# Patient Record
Sex: FEMALE | Race: WHITE | Employment: UNEMPLOYED | URBAN - METROPOLITAN AREA
[De-identification: names, ages, dates, MRNs, and addresses within clinical notes are randomized per-mention and may not be internally consistent; named-entity substitution may affect disease eponyms.]

---

## 2017-05-12 PROBLEM — M51.36 DDD (DEGENERATIVE DISC DISEASE), LUMBAR: Status: ACTIVE | Noted: 2017-05-12

## 2017-05-12 PROBLEM — M54.30 SCIATIC LEG PAIN: Status: ACTIVE | Noted: 2017-05-12

## 2017-06-12 PROBLEM — M51.26 LUMBAR DISC HERNIATION: Status: ACTIVE | Noted: 2017-06-12

## 2017-06-12 PROBLEM — M48.061 LUMBAR STENOSIS: Status: ACTIVE | Noted: 2017-06-12

## 2019-08-20 ENCOUNTER — OFFICE VISIT (OUTPATIENT)
Dept: FAMILY MEDICINE CLINIC | Age: 59
End: 2019-08-20
Payer: COMMERCIAL

## 2019-08-20 VITALS
HEIGHT: 62 IN | TEMPERATURE: 98.5 F | HEART RATE: 88 BPM | BODY MASS INDEX: 35.33 KG/M2 | SYSTOLIC BLOOD PRESSURE: 122 MMHG | WEIGHT: 192 LBS | OXYGEN SATURATION: 96 % | RESPIRATION RATE: 18 BRPM | DIASTOLIC BLOOD PRESSURE: 78 MMHG

## 2019-08-20 DIAGNOSIS — R73.01 IFG (IMPAIRED FASTING GLUCOSE): ICD-10-CM

## 2019-08-20 DIAGNOSIS — M48.061 SPINAL STENOSIS OF LUMBAR REGION, UNSPECIFIED WHETHER NEUROGENIC CLAUDICATION PRESENT: ICD-10-CM

## 2019-08-20 DIAGNOSIS — Z12.31 SCREENING MAMMOGRAM, ENCOUNTER FOR: ICD-10-CM

## 2019-08-20 DIAGNOSIS — I10 ESSENTIAL HYPERTENSION: Primary | ICD-10-CM

## 2019-08-20 DIAGNOSIS — Z90.49 HISTORY OF CHOLECYSTECTOMY: ICD-10-CM

## 2019-08-20 DIAGNOSIS — Z98.84 HISTORY OF ROUX-EN-Y GASTRIC BYPASS: ICD-10-CM

## 2019-08-20 DIAGNOSIS — E03.9 ACQUIRED HYPOTHYROIDISM: ICD-10-CM

## 2019-08-20 DIAGNOSIS — Z83.3 FAMILY HISTORY OF DIABETES MELLITUS (DM): ICD-10-CM

## 2019-08-20 DIAGNOSIS — Z12.11 SCREEN FOR COLON CANCER: ICD-10-CM

## 2019-08-20 DIAGNOSIS — R53.83 FATIGUE, UNSPECIFIED TYPE: ICD-10-CM

## 2019-08-20 DIAGNOSIS — E78.5 DYSLIPIDEMIA: ICD-10-CM

## 2019-08-20 LAB — HBA1C MFR BLD: 5.3 %

## 2019-08-20 PROCEDURE — 99203 OFFICE O/P NEW LOW 30 MIN: CPT | Performed by: FAMILY MEDICINE

## 2019-08-20 PROCEDURE — 83036 HEMOGLOBIN GLYCOSYLATED A1C: CPT | Performed by: FAMILY MEDICINE

## 2019-08-20 RX ORDER — CALCIUM CARBONATE 500(1250)
1000 TABLET ORAL DAILY
COMMUNITY

## 2019-08-20 RX ORDER — FERROUS SULFATE 325(65) MG
325 TABLET ORAL
COMMUNITY

## 2019-08-20 RX ORDER — LEVOTHYROXINE SODIUM 0.05 MG/1
50 TABLET ORAL DAILY
Qty: 90 TABLET | Refills: 1 | Status: SHIPPED
Start: 2019-08-20 | End: 2020-02-05

## 2019-08-20 ASSESSMENT — ENCOUNTER SYMPTOMS
ABDOMINAL DISTENTION: 0
ALLERGIC/IMMUNOLOGIC NEGATIVE: 1
SINUS PAIN: 0
BOWEL INCONTINENCE: 0
EYE REDNESS: 0
RECTAL PAIN: 0
CHEST TIGHTNESS: 0
STRIDOR: 0
EYE PAIN: 0
SINUS PRESSURE: 0
SORE THROAT: 0
EYE DISCHARGE: 0
SHORTNESS OF BREATH: 0
RHINORRHEA: 0
CONSTIPATION: 0
COLOR CHANGE: 0
EYE ITCHING: 0
CHOKING: 0
BLURRED VISION: 0
BACK PAIN: 1
ANAL BLEEDING: 0
NAUSEA: 0
TROUBLE SWALLOWING: 0
DIARRHEA: 0
APNEA: 0
FACIAL SWELLING: 0
VOMITING: 0
BLOOD IN STOOL: 0
ORTHOPNEA: 0
COUGH: 0
ABDOMINAL PAIN: 0
RESPIRATORY NEGATIVE: 1
VOICE CHANGE: 0
PHOTOPHOBIA: 0
WHEEZING: 0
GASTROINTESTINAL NEGATIVE: 1

## 2019-08-20 ASSESSMENT — PATIENT HEALTH QUESTIONNAIRE - PHQ9
2. FEELING DOWN, DEPRESSED OR HOPELESS: 1
SUM OF ALL RESPONSES TO PHQ9 QUESTIONS 1 & 2: 2
SUM OF ALL RESPONSES TO PHQ QUESTIONS 1-9: 2
1. LITTLE INTEREST OR PLEASURE IN DOING THINGS: 1
SUM OF ALL RESPONSES TO PHQ QUESTIONS 1-9: 2

## 2019-08-20 NOTE — PROGRESS NOTES
is present all day. Pertinent negatives include no abdominal pain, bladder incontinence, bowel incontinence, chest pain, dysuria, fever, headaches, leg pain, numbness, paresis, paresthesias, pelvic pain, perianal numbness, tingling, weakness or weight loss. ROS:  Review of Systems   Constitutional: Negative for activity change, appetite change, chills, diaphoresis, fever, malaise/fatigue, unexpected weight change and weight loss. HENT: Negative. Negative for congestion, dental problem, drooling, ear discharge, ear pain, facial swelling, hearing loss, mouth sores, nosebleeds, postnasal drip, rhinorrhea, sinus pressure, sinus pain, sneezing, sore throat, tinnitus, trouble swallowing and voice change. Eyes: Negative for blurred vision, photophobia, pain, discharge, redness, itching and visual disturbance. Respiratory: Negative. Negative for apnea, cough, choking, chest tightness, shortness of breath, wheezing and stridor. Cardiovascular: Negative. Negative for chest pain, palpitations, orthopnea, leg swelling and PND. Gastrointestinal: Negative. Negative for abdominal distention, abdominal pain, anal bleeding, blood in stool, bowel incontinence, constipation, diarrhea, nausea, rectal pain and vomiting. Endocrine: Negative. Negative for cold intolerance and heat intolerance. Genitourinary: Negative. Negative for bladder incontinence, decreased urine volume, difficulty urinating, dyspareunia, dysuria, enuresis, flank pain, frequency, genital sores, hematuria, menstrual problem, pelvic pain, urgency, vaginal bleeding, vaginal discharge and vaginal pain. Musculoskeletal: Positive for back pain. Negative for arthralgias, gait problem, joint swelling, myalgias, neck pain and neck stiffness. Skin: Negative. Negative for color change, rash and wound. Allergic/Immunologic: Negative. Negative for environmental allergies, food allergies and immunocompromised state. Neurological: Negative. Negative for tingling, syncope, facial asymmetry, weakness, light-headedness, numbness, headaches and paresthesias. Hematological: Negative. Negative for adenopathy. Does not bruise/bleed easily. Psychiatric/Behavioral: Negative. Negative for agitation, behavioral problems, decreased concentration, dysphoric mood, hallucinations, self-injury, sleep disturbance and suicidal ideas. The patient is not hyperactive. Past Medical/Surgical Hx;  Reviewed with patient      Diagnosis Date    Anxiety     Chronic back pain     Hypothyroidism     Meniere's disease     Unspecified sleep apnea      Past Surgical History:   Procedure Laterality Date     SECTION      CHOLECYSTECTOMY  2012 - Dr. Aldo Viramontes, [facility], Laparoscopic     RIGHT COLECTOMY  2014    Justa Saunders - laparoscopic right colectomy     TAMMIE-EN-Y GASTRIC BYPASS  2010 - Laparoscopic    TUBAL LIGATION      UPPER GASTROINTESTINAL ENDOSCOPY         Past Family Hx:  Reviewed with patient      Problem Relation Age of Onset    High Blood Pressure Mother     Heart Disease Mother     Obesity Mother     High Blood Pressure Father     High Blood Pressure Brother     Cancer Brother     Obesity Brother        Social Hx:  Reviewed with patient  Social History     Tobacco Use    Smoking status: Current Every Day Smoker     Packs/day: 1.00     Years: 7.00     Pack years: 7.00     Types: Cigarettes     Start date: 2012    Smokeless tobacco: Never Used    Tobacco comment: Previously smoked for 8 years, then quit for 15 years, then started again   Substance Use Topics    Alcohol use: No       OBJECTIVE  /78   Pulse 88   Temp 98.5 °F (36.9 °C) (Temporal)   Resp 18   Ht 5' 2\" (1.575 m)   Wt 192 lb (87.1 kg)   LMP  (LMP Unknown)   SpO2 96%   Breastfeeding? No   BMI 35.12 kg/m²     Problem List:  Maria Moeller does not have any pertinent problems on file.     PHYS EX:  Physical Exam   Constitutional: She is oriented to person,

## 2019-09-26 ENCOUNTER — HOSPITAL ENCOUNTER (OUTPATIENT)
Dept: MAMMOGRAPHY | Age: 59
Discharge: HOME | End: 2019-09-28
Payer: COMMERCIAL

## 2019-09-26 DIAGNOSIS — Z12.31 SCREENING MAMMOGRAM, ENCOUNTER FOR: ICD-10-CM

## 2019-10-25 ENCOUNTER — PREP FOR PROCEDURE (OUTPATIENT)
Dept: PAIN MANAGEMENT | Age: 59
End: 2019-10-25

## 2019-10-25 ENCOUNTER — OFFICE VISIT (OUTPATIENT)
Dept: PAIN MANAGEMENT | Age: 59
End: 2019-10-25
Payer: COMMERCIAL

## 2019-10-25 VITALS
DIASTOLIC BLOOD PRESSURE: 80 MMHG | BODY MASS INDEX: 32.78 KG/M2 | HEART RATE: 110 BPM | RESPIRATION RATE: 16 BRPM | SYSTOLIC BLOOD PRESSURE: 122 MMHG | OXYGEN SATURATION: 97 % | TEMPERATURE: 98.6 F | WEIGHT: 185 LBS | HEIGHT: 63 IN

## 2019-10-25 DIAGNOSIS — M47.812 CERVICAL FACET JOINT SYNDROME: ICD-10-CM

## 2019-10-25 DIAGNOSIS — G89.4 CHRONIC PAIN SYNDROME: ICD-10-CM

## 2019-10-25 DIAGNOSIS — M47.816 LUMBAR SPONDYLOSIS: ICD-10-CM

## 2019-10-25 DIAGNOSIS — M54.16 LUMBAR RADICULOPATHY: Primary | ICD-10-CM

## 2019-10-25 DIAGNOSIS — M51.9 LUMBAR DISC DISORDER: ICD-10-CM

## 2019-10-25 DIAGNOSIS — M47.812 CERVICAL SPONDYLOSIS: ICD-10-CM

## 2019-10-25 DIAGNOSIS — M47.816 LUMBAR FACET ARTHROPATHY: ICD-10-CM

## 2019-10-25 PROCEDURE — 99204 OFFICE O/P NEW MOD 45 MIN: CPT | Performed by: PAIN MEDICINE

## 2019-11-12 ENCOUNTER — TELEPHONE (OUTPATIENT)
Dept: PAIN MANAGEMENT | Age: 59
End: 2019-11-12

## 2020-02-07 ENCOUNTER — OFFICE VISIT (OUTPATIENT)
Dept: FAMILY MEDICINE CLINIC | Age: 60
End: 2020-02-07
Payer: COMMERCIAL

## 2020-02-07 ENCOUNTER — HOSPITAL ENCOUNTER (OUTPATIENT)
Age: 60
Discharge: HOME | End: 2020-02-09
Payer: COMMERCIAL

## 2020-02-07 VITALS
HEART RATE: 92 BPM | WEIGHT: 198.8 LBS | HEIGHT: 63 IN | DIASTOLIC BLOOD PRESSURE: 74 MMHG | OXYGEN SATURATION: 97 % | RESPIRATION RATE: 18 BRPM | TEMPERATURE: 98.2 F | SYSTOLIC BLOOD PRESSURE: 130 MMHG | BODY MASS INDEX: 35.22 KG/M2

## 2020-02-07 LAB
ALBUMIN SERPL-MCNC: 4.2 G/DL (ref 3.5–5.2)
ALP BLD-CCNC: 117 U/L (ref 35–104)
ALT SERPL-CCNC: 11 U/L (ref 0–32)
ANION GAP SERPL CALCULATED.3IONS-SCNC: 16 MMOL/L (ref 7–16)
AST SERPL-CCNC: 17 U/L (ref 0–31)
BASOPHILS ABSOLUTE: 0.09 E9/L (ref 0–0.2)
BASOPHILS RELATIVE PERCENT: 0.8 % (ref 0–2)
BILIRUB SERPL-MCNC: <0.2 MG/DL (ref 0–1.2)
BUN BLDV-MCNC: 8 MG/DL (ref 6–20)
CALCIUM SERPL-MCNC: 9.5 MG/DL (ref 8.6–10.2)
CHLORIDE BLD-SCNC: 106 MMOL/L (ref 98–107)
CHOLESTEROL, TOTAL: 167 MG/DL (ref 0–199)
CO2: 22 MMOL/L (ref 22–29)
CREAT SERPL-MCNC: 0.7 MG/DL (ref 0.5–1)
EOSINOPHILS ABSOLUTE: 0.15 E9/L (ref 0.05–0.5)
EOSINOPHILS RELATIVE PERCENT: 1.3 % (ref 0–6)
GFR AFRICAN AMERICAN: >60
GFR NON-AFRICAN AMERICAN: >60 ML/MIN/1.73
GLUCOSE BLD-MCNC: 90 MG/DL (ref 74–99)
HBA1C MFR BLD: 5.2 % (ref 4–5.6)
HCT VFR BLD CALC: 44.4 % (ref 34–48)
HDLC SERPL-MCNC: 40 MG/DL
HEMOGLOBIN: 13.9 G/DL (ref 11.5–15.5)
IMMATURE GRANULOCYTES #: 0.05 E9/L
IMMATURE GRANULOCYTES %: 0.4 % (ref 0–5)
LDL CHOLESTEROL CALCULATED: 82 MG/DL (ref 0–99)
LYMPHOCYTES ABSOLUTE: 3.24 E9/L (ref 1.5–4)
LYMPHOCYTES RELATIVE PERCENT: 28.4 % (ref 20–42)
MCH RBC QN AUTO: 31 PG (ref 26–35)
MCHC RBC AUTO-ENTMCNC: 31.3 % (ref 32–34.5)
MCV RBC AUTO: 99.1 FL (ref 80–99.9)
MONOCYTES ABSOLUTE: 0.96 E9/L (ref 0.1–0.95)
MONOCYTES RELATIVE PERCENT: 8.4 % (ref 2–12)
NEUTROPHILS ABSOLUTE: 6.91 E9/L (ref 1.8–7.3)
NEUTROPHILS RELATIVE PERCENT: 60.7 % (ref 43–80)
PDW BLD-RTO: 13.3 FL (ref 11.5–15)
PLATELET # BLD: 215 E9/L (ref 130–450)
PMV BLD AUTO: 13 FL (ref 7–12)
POTASSIUM SERPL-SCNC: 4.6 MMOL/L (ref 3.5–5)
RBC # BLD: 4.48 E12/L (ref 3.5–5.5)
SODIUM BLD-SCNC: 144 MMOL/L (ref 132–146)
TOTAL PROTEIN: 7.3 G/DL (ref 6.4–8.3)
TRIGL SERPL-MCNC: 227 MG/DL (ref 0–149)
TSH SERPL DL<=0.05 MIU/L-ACNC: 1.68 UIU/ML (ref 0.27–4.2)
VLDLC SERPL CALC-MCNC: 45 MG/DL
WBC # BLD: 11.4 E9/L (ref 4.5–11.5)

## 2020-02-07 PROCEDURE — 84443 ASSAY THYROID STIM HORMONE: CPT

## 2020-02-07 PROCEDURE — 96160 PT-FOCUSED HLTH RISK ASSMT: CPT | Performed by: FAMILY MEDICINE

## 2020-02-07 PROCEDURE — 99214 OFFICE O/P EST MOD 30 MIN: CPT | Performed by: FAMILY MEDICINE

## 2020-02-07 PROCEDURE — 83036 HEMOGLOBIN GLYCOSYLATED A1C: CPT

## 2020-02-07 PROCEDURE — G8484 FLU IMMUNIZE NO ADMIN: HCPCS | Performed by: FAMILY MEDICINE

## 2020-02-07 PROCEDURE — 85025 COMPLETE CBC W/AUTO DIFF WBC: CPT

## 2020-02-07 PROCEDURE — 3017F COLORECTAL CA SCREEN DOC REV: CPT | Performed by: FAMILY MEDICINE

## 2020-02-07 PROCEDURE — 80053 COMPREHEN METABOLIC PANEL: CPT

## 2020-02-07 PROCEDURE — 80061 LIPID PANEL: CPT

## 2020-02-07 PROCEDURE — 4004F PT TOBACCO SCREEN RCVD TLK: CPT | Performed by: FAMILY MEDICINE

## 2020-02-07 PROCEDURE — G8417 CALC BMI ABV UP PARAM F/U: HCPCS | Performed by: FAMILY MEDICINE

## 2020-02-07 PROCEDURE — G8427 DOCREV CUR MEDS BY ELIG CLIN: HCPCS | Performed by: FAMILY MEDICINE

## 2020-02-07 RX ORDER — LEVOTHYROXINE SODIUM 0.05 MG/1
50 TABLET ORAL DAILY
Qty: 90 TABLET | Refills: 1 | Status: SHIPPED
Start: 2020-02-07 | End: 2020-05-29 | Stop reason: SDUPTHER

## 2020-02-07 RX ORDER — PAROXETINE 10 MG/1
10 TABLET, FILM COATED ORAL DAILY
Qty: 30 TABLET | Refills: 3 | Status: SHIPPED
Start: 2020-02-07 | End: 2020-05-26

## 2020-02-07 RX ORDER — CEFDINIR 300 MG/1
300 CAPSULE ORAL 2 TIMES DAILY
Qty: 14 CAPSULE | Refills: 0 | Status: SHIPPED | OUTPATIENT
Start: 2020-02-07 | End: 2020-02-14

## 2020-02-07 RX ORDER — DEXAMETHASONE SODIUM PHOSPHATE 4 MG/ML
4 INJECTION, SOLUTION INTRA-ARTICULAR; INTRALESIONAL; INTRAMUSCULAR; INTRAVENOUS; SOFT TISSUE ONCE
Status: COMPLETED | OUTPATIENT
Start: 2020-02-07 | End: 2020-02-07

## 2020-02-07 RX ADMIN — DEXAMETHASONE SODIUM PHOSPHATE 4 MG: 4 INJECTION, SOLUTION INTRA-ARTICULAR; INTRALESIONAL; INTRAMUSCULAR; INTRAVENOUS; SOFT TISSUE at 09:02

## 2020-02-07 ASSESSMENT — PATIENT HEALTH QUESTIONNAIRE - PHQ9
5. POOR APPETITE OR OVEREATING: 3
2. FEELING DOWN, DEPRESSED OR HOPELESS: 3
SUM OF ALL RESPONSES TO PHQ QUESTIONS 1-9: 17
10. IF YOU CHECKED OFF ANY PROBLEMS, HOW DIFFICULT HAVE THESE PROBLEMS MADE IT FOR YOU TO DO YOUR WORK, TAKE CARE OF THINGS AT HOME, OR GET ALONG WITH OTHER PEOPLE: 1
1. LITTLE INTEREST OR PLEASURE IN DOING THINGS: 2
6. FEELING BAD ABOUT YOURSELF - OR THAT YOU ARE A FAILURE OR HAVE LET YOURSELF OR YOUR FAMILY DOWN: 3
8. MOVING OR SPEAKING SO SLOWLY THAT OTHER PEOPLE COULD HAVE NOTICED. OR THE OPPOSITE, BEING SO FIGETY OR RESTLESS THAT YOU HAVE BEEN MOVING AROUND A LOT MORE THAN USUAL: 0
3. TROUBLE FALLING OR STAYING ASLEEP: 3
4. FEELING TIRED OR HAVING LITTLE ENERGY: 3
SUM OF ALL RESPONSES TO PHQ QUESTIONS 1-9: 17
9. THOUGHTS THAT YOU WOULD BE BETTER OFF DEAD, OR OF HURTING YOURSELF: 0
SUM OF ALL RESPONSES TO PHQ9 QUESTIONS 1 & 2: 5
7. TROUBLE CONCENTRATING ON THINGS, SUCH AS READING THE NEWSPAPER OR WATCHING TELEVISION: 0

## 2020-02-11 ASSESSMENT — ENCOUNTER SYMPTOMS
ABDOMINAL DISTENTION: 0
BLOOD IN STOOL: 0
PHOTOPHOBIA: 0
RECTAL PAIN: 0
FACIAL SWELLING: 0
EYE DISCHARGE: 0
CHOKING: 0
SINUS PRESSURE: 1
EYES NEGATIVE: 1
RHINORRHEA: 1
EYE ITCHING: 0
STRIDOR: 0
DIARRHEA: 0
VOICE CHANGE: 0
SINUS PAIN: 1
EYE PAIN: 0
ANAL BLEEDING: 0
COLOR CHANGE: 0
WHEEZING: 0
SHORTNESS OF BREATH: 0
ABDOMINAL PAIN: 0
EYE REDNESS: 0
TROUBLE SWALLOWING: 0
BACK PAIN: 1
NAUSEA: 0
ALLERGIC/IMMUNOLOGIC NEGATIVE: 1
VOMITING: 0
SORE THROAT: 0
COUGH: 1
CHEST TIGHTNESS: 0
CONSTIPATION: 0

## 2020-02-11 NOTE — PROGRESS NOTES
Future  Not controlled. Lab, low chol. Diet. IFG (impaired fasting glucose)  -     CBC Auto Differential; Future  -     Comprehensive Metabolic Panel; Future  -     Hemoglobin A1C; Future  Patient instructed on labs. Fatigue, unspecified type  -     CBC Auto Differential; Future  -     Comprehensive Metabolic Panel; Future  -     TSH without Reflex; Future  Not controlled. Lab. Screening for breast cancer  -     [facility] DIGITAL SCREEN BILATERAL PER PROTOCOL; Future  -     CBC Auto Differential; Future  -     Comprehensive Metabolic Panel; Future  Pt instructed on matthieu. Radha.   Anxiety  -     PARoxetine (PAXIL) 10 MG tablet; Take 1 tablet by mouth daily  Stable. Depression, unspecified depression type  -     PARoxetine (PAXIL) 10 MG tablet; Take 1 tablet by mouth daily  Stable. Pt instructed if any worse go ED ASAP. Outpatient Encounter Medications as of 2020   Medication Sig Dispense Refill    levothyroxine (SYNTHROID) 50 MCG tablet Take 1 tablet by mouth daily 90 tablet 1    cefdinir (OMNICEF) 300 MG capsule Take 1 capsule by mouth 2 times daily for 7 days 14 capsule 0    PARoxetine (PAXIL) 10 MG tablet Take 1 tablet by mouth daily 30 tablet 3    calcium carbonate (OSCAL) 500 MG TABS tablet Take 1,000 mg by mouth daily      ferrous sulfate 325 (65 Fe) MG tablet Take 325 mg by mouth daily (with breakfast)      Multiple Vitamin (MULTIVITAMIN PO) Take 1 tablet by mouth 2 times daily. Ultra Rodrick       [DISCONTINUED] levothyroxine (SYNTHROID) 50 MCG tablet Take 1 tablet by mouth daily - appointment required for further refills. 30 tablet 0    [] dexamethasone (DECADRON) injection 4 mg        No facility-administered encounter medications on file as of 2020. Return in about 6 months (around 2020).         Reviewed recent labs related to Dolores's current problems      Discussed importance of regular Health Maintenance follow up  Health Maintenance   Topic    Hepatitis C screen

## 2020-02-20 ENCOUNTER — HOSPITAL ENCOUNTER (OUTPATIENT)
Dept: MAMMOGRAPHY | Age: 60
Discharge: HOME | End: 2020-02-22
Payer: COMMERCIAL

## 2020-02-20 PROCEDURE — 77063 BREAST TOMOSYNTHESIS BI: CPT

## 2020-03-02 ENCOUNTER — TELEPHONE (OUTPATIENT)
Dept: ONCOLOGY | Age: 60
End: 2020-03-02

## 2020-03-02 NOTE — TELEPHONE ENCOUNTER
Call to patient in reference to her mammogram performed on the [facility] on February 20, 2020. Instructed patient that the radiologist has recommended some additional breast imaging, in order to make a final determination/result. Patient requests to go to [facility]- for additional imaging. Advised her results will be faxed to ordering physician, and that office will refer her to [facility] [address]. Verbalizes understanding and is agreeable to proceed.        Cara Joy RN, BSN, [facility]   [facility]

## 2020-03-05 ENCOUNTER — TELEPHONE (OUTPATIENT)
Dept: FAMILY MEDICINE CLINIC | Age: 60
End: 2020-03-05

## 2020-03-13 ENCOUNTER — TELEPHONE (OUTPATIENT)
Dept: FAMILY MEDICINE CLINIC | Age: 60
End: 2020-03-13

## 2020-03-13 RX ORDER — FLUTICASONE PROPIONATE 50 MCG
1 SPRAY, SUSPENSION (ML) NASAL DAILY
Qty: 1 BOTTLE | Refills: 3 | Status: SHIPPED
Start: 2020-03-13 | End: 2020-08-24

## 2020-03-13 NOTE — TELEPHONE ENCOUNTER
Patient canceled appointment today due to having 3 children at home, patient is requesting a medication for seasonal allergies.

## 2020-03-16 ENCOUNTER — TELEPHONE (OUTPATIENT)
Dept: FAMILY MEDICINE CLINIC | Age: 60
End: 2020-03-16

## 2020-03-16 NOTE — TELEPHONE ENCOUNTER
Patient called in requesting an antibiotic, or something stronger for allergies instead of flonase/nasal spray.

## 2020-03-16 NOTE — TELEPHONE ENCOUNTER
Antibiotics do not work for allergy  If no bacterial infection, they are inappropriate   Take flonase and claritin  If no better, come in to be seen

## 2020-03-23 ENCOUNTER — TELEPHONE (OUTPATIENT)
Dept: FAMILY MEDICINE CLINIC | Age: 60
End: 2020-03-23

## 2020-03-23 RX ORDER — ALBUTEROL SULFATE 90 UG/1
2 AEROSOL, METERED RESPIRATORY (INHALATION) EVERY 6 HOURS PRN
Qty: 1 INHALER | Refills: 3 | Status: SHIPPED
Start: 2020-03-23 | End: 2020-06-25

## 2020-03-23 RX ORDER — TIOTROPIUM BROMIDE INHALATION SPRAY 1.56 UG/1
2 SPRAY, METERED RESPIRATORY (INHALATION) DAILY
Qty: 1 INHALER | Refills: 0 | Status: SHIPPED
Start: 2020-03-23 | End: 2020-04-17

## 2020-03-23 RX ORDER — AZELASTINE 1 MG/ML
2 SPRAY, METERED NASAL 2 TIMES DAILY
Qty: 1 BOTTLE | Refills: 3 | Status: SHIPPED
Start: 2020-03-23 | End: 2020-11-18 | Stop reason: CLARIF

## 2020-03-23 RX ORDER — GUAIFENESIN/DEXTROMETHORPHAN 100-10MG/5
10 SYRUP ORAL 3 TIMES DAILY PRN
Qty: 240 ML | Refills: 3 | Status: SHIPPED
Start: 2020-03-23 | End: 2020-09-23 | Stop reason: CLARIF

## 2020-03-23 NOTE — TELEPHONE ENCOUNTER
Patients  called stating patient may have a sinus infection now, instead of just allergies. Patient has cough, congestion.  states she has been up and down throughout the past two nights with severe cough. States she is having a hard time getting the mucous out.

## 2020-03-23 NOTE — TELEPHONE ENCOUNTER
Take tylenol every 6 hours as needed for temperature, aches and pain  Hydrate with 40 to 50 oz of fluids  I have sent medication in for you  Please keep in touch with me and let me know how you are doing  If you get worse, call as soon as possible or seek immediate medical attention   I am send in a chest X ray please

## 2020-03-24 NOTE — TELEPHONE ENCOUNTER
This MA attempted to reach pt x3 - pt's phone rang to voicemail. MA left message for pt advising this was the third time we have attempted to reach her with care instructions from Dr. Jose Cid. MA requested for pt to return call to office as soon as she is able to in order to receive her care instructions. Office telephone and extension left on voicemail for pt as well.       Electronically signed by María Post MA on 3/24/20 at 8:53 AM EDT

## 2020-04-17 RX ORDER — TIOTROPIUM BROMIDE INHALATION SPRAY 1.56 UG/1
2 SPRAY, METERED RESPIRATORY (INHALATION) DAILY
Qty: 4 G | Refills: 2 | Status: SHIPPED
Start: 2020-04-17 | End: 2020-07-23

## 2020-05-26 RX ORDER — PAROXETINE 10 MG/1
10 TABLET, FILM COATED ORAL DAILY
Qty: 90 TABLET | Refills: 0 | Status: SHIPPED
Start: 2020-05-26 | End: 2020-05-29 | Stop reason: SDUPTHER

## 2020-05-29 ENCOUNTER — VIRTUAL VISIT (OUTPATIENT)
Dept: FAMILY MEDICINE CLINIC | Age: 60
End: 2020-05-29
Payer: COMMERCIAL

## 2020-05-29 VITALS — WEIGHT: 180 LBS | BODY MASS INDEX: 33.13 KG/M2 | HEIGHT: 62 IN

## 2020-05-29 PROCEDURE — G8427 DOCREV CUR MEDS BY ELIG CLIN: HCPCS | Performed by: FAMILY MEDICINE

## 2020-05-29 PROCEDURE — G8417 CALC BMI ABV UP PARAM F/U: HCPCS | Performed by: FAMILY MEDICINE

## 2020-05-29 PROCEDURE — 3017F COLORECTAL CA SCREEN DOC REV: CPT | Performed by: FAMILY MEDICINE

## 2020-05-29 PROCEDURE — 99214 OFFICE O/P EST MOD 30 MIN: CPT | Performed by: FAMILY MEDICINE

## 2020-05-29 PROCEDURE — 4004F PT TOBACCO SCREEN RCVD TLK: CPT | Performed by: FAMILY MEDICINE

## 2020-05-29 RX ORDER — LEVOTHYROXINE SODIUM 0.05 MG/1
50 TABLET ORAL DAILY
Qty: 90 TABLET | Refills: 1 | Status: SHIPPED
Start: 2020-05-29 | End: 2021-03-04 | Stop reason: SDUPTHER

## 2020-05-29 RX ORDER — MECLIZINE HYDROCHLORIDE 25 MG/1
25 TABLET ORAL 2 TIMES DAILY PRN
Qty: 30 TABLET | Refills: 0 | Status: SHIPPED | OUTPATIENT
Start: 2020-05-29 | End: 2020-06-13

## 2020-05-29 RX ORDER — PAROXETINE 10 MG/1
10 TABLET, FILM COATED ORAL DAILY
Qty: 90 TABLET | Refills: 1 | Status: SHIPPED
Start: 2020-05-29 | End: 2020-11-18

## 2020-06-03 PROBLEM — H81.09 MENIERE'S DISEASE: Status: ACTIVE | Noted: 2020-06-03

## 2020-06-03 PROBLEM — F41.9 ANXIETY: Status: ACTIVE | Noted: 2020-06-03

## 2020-06-03 PROBLEM — K04.7 INFECTED DENTAL CARRIES: Status: ACTIVE | Noted: 2020-06-03

## 2020-06-03 PROBLEM — F32.A DEPRESSION: Status: ACTIVE | Noted: 2020-06-03

## 2020-06-03 PROBLEM — K02.9 INFECTED DENTAL CARRIES: Status: ACTIVE | Noted: 2020-06-03

## 2020-06-03 PROBLEM — K04.7 INFECTED TOOTH: Status: ACTIVE | Noted: 2020-06-03

## 2020-06-03 ASSESSMENT — ENCOUNTER SYMPTOMS
COUGH: 0
FACIAL SWELLING: 0
NAUSEA: 0
VOICE CHANGE: 0
RHINORRHEA: 0
BACK PAIN: 0
EYES NEGATIVE: 1
ANAL BLEEDING: 0
DIARRHEA: 0
PHOTOPHOBIA: 0
CHEST TIGHTNESS: 0
WHEEZING: 0
EYE ITCHING: 0
CHOKING: 0
SINUS PRESSURE: 0
VOMITING: 0
CONSTIPATION: 0
COLOR CHANGE: 0
STRIDOR: 0
ABDOMINAL DISTENTION: 0
SORE THROAT: 0
EYE PAIN: 0
ALLERGIC/IMMUNOLOGIC NEGATIVE: 1
SINUS PAIN: 0
ABDOMINAL PAIN: 0
EYE REDNESS: 0
RECTAL PAIN: 0
TROUBLE SWALLOWING: 0
EYE DISCHARGE: 0
RESPIRATORY NEGATIVE: 1
BLOOD IN STOOL: 0
SHORTNESS OF BREATH: 0

## 2020-06-03 NOTE — PROGRESS NOTES
1 tablet by mouth daily  Controlled. Anxiety  -     PARoxetine (PAXIL) 10 MG tablet; Take 1 tablet by mouth daily  Stable. Depression, unspecified depression type  -     PARoxetine (PAXIL) 10 MG tablet; Take 1 tablet by mouth daily  Controlled. Infected dental carries  Not controlled. Dentist, antivert before dental surgery. Pt instructed if any worse go ED ASAP. Outpatient Encounter Medications as of 5/29/2020   Medication Sig Dispense Refill    levothyroxine (SYNTHROID) 50 MCG tablet Take 1 tablet by mouth daily 90 tablet 1    PARoxetine (PAXIL) 10 MG tablet Take 1 tablet by mouth daily 90 tablet 1    meclizine (ANTIVERT) 25 MG tablet Take 1 tablet by mouth 2 times daily as needed for Dizziness 30 tablet 0    SPIRIVA RESPIMAT 1.25 MCG/ACT AERS inhaler INHALE 2 PUFFS INTO THE LUNGS DAILY 4 g 2    azelastine (ASTELIN) 0.1 % nasal spray 2 sprays by Nasal route 2 times daily Use in each nostril as directed 1 Bottle 3    albuterol sulfate  (90 Base) MCG/ACT inhaler Inhale 2 puffs into the lungs every 6 hours as needed for Wheezing or Shortness of Breath 1 Inhaler 3    fluticasone (FLONASE) 50 MCG/ACT nasal spray 1 spray by Nasal route daily 1 Bottle 3    calcium carbonate (OSCAL) 500 MG TABS tablet Take 1,000 mg by mouth daily      ferrous sulfate 325 (65 Fe) MG tablet Take 325 mg by mouth daily (with breakfast)      Multiple Vitamin (MULTIVITAMIN PO) Take 1 tablet by mouth 2 times daily. Ultra Rodrick       [DISCONTINUED] PARoxetine (PAXIL) 10 MG tablet TAKE 1 TABLET BY MOUTH DAILY 90 tablet 0    guaiFENesin-dextromethorphan (ROBITUSSIN DM) 100-10 MG/5ML syrup Take 10 mLs by mouth 3 times daily as needed for Cough (Patient not taking: Reported on 5/29/2020) 240 mL 3    [DISCONTINUED] levothyroxine (SYNTHROID) 50 MCG tablet Take 1 tablet by mouth daily 90 tablet 1     No facility-administered encounter medications on file as of 5/29/2020. No follow-ups on file.         Reviewed recent labs related to Dolores's current problems      Discussed importance of regular Health Maintenance follow up  Health Maintenance   Topic    Hepatitis C screen     Pneumococcal 0-64 years Vaccine (1 of 1 - PPSV23)    HIV screen     DTaP/Tdap/Td vaccine (1 - Tdap)    Cervical cancer screen     Shingles Vaccine (1 of 2)    Flu vaccine (Season Ended)    TSH testing     Breast cancer screen     Lipid screen     Colon cancer screen colonoscopy     Hepatitis A vaccine     Hepatitis B vaccine     Hib vaccine     Meningococcal (ACWY) vaccine

## 2020-06-25 RX ORDER — ALBUTEROL SULFATE 90 UG/1
2 AEROSOL, METERED RESPIRATORY (INHALATION) EVERY 6 HOURS PRN
Qty: 1 INHALER | Refills: 3 | Status: SHIPPED
Start: 2020-06-25 | End: 2020-10-13

## 2020-06-29 ENCOUNTER — TELEPHONE (OUTPATIENT)
Dept: ONCOLOGY | Age: 60
End: 2020-06-29

## 2020-07-01 ENCOUNTER — TELEPHONE (OUTPATIENT)
Dept: FAMILY MEDICINE CLINIC | Age: 60
End: 2020-07-01

## 2020-07-01 RX ORDER — AMOXICILLIN 500 MG/1
500 CAPSULE ORAL 3 TIMES DAILY
Qty: 21 CAPSULE | Refills: 0 | Status: SHIPPED | OUTPATIENT
Start: 2020-07-01 | End: 2020-07-08

## 2020-07-01 NOTE — TELEPHONE ENCOUNTER
Patient had teeth extracted 6/8. Patient is experiencing a lot of pressure in her face.  Patient wants to know if an antibiotic can be prescribed

## 2020-07-01 NOTE — TELEPHONE ENCOUNTER
We have been trying to get additional views on her left breast since febuary  Please let us set these up

## 2020-07-06 ENCOUNTER — HOSPITAL ENCOUNTER (OUTPATIENT)
Dept: GENERAL RADIOLOGY | Age: 60
Discharge: HOME | End: 2020-07-08
Payer: COMMERCIAL

## 2020-07-06 PROCEDURE — G0279 TOMOSYNTHESIS, MAMMO: HCPCS

## 2020-07-23 RX ORDER — TIOTROPIUM BROMIDE INHALATION SPRAY 1.56 UG/1
2 SPRAY, METERED RESPIRATORY (INHALATION) DAILY
Qty: 4 G | Refills: 2 | Status: SHIPPED
Start: 2020-07-23 | End: 2020-10-21

## 2020-08-24 RX ORDER — FLUTICASONE PROPIONATE 50 MCG
SPRAY, SUSPENSION (ML) NASAL
Qty: 16 G | Refills: 1 | Status: SHIPPED
Start: 2020-08-24 | End: 2020-10-21

## 2020-09-23 ENCOUNTER — OFFICE VISIT (OUTPATIENT)
Dept: FAMILY MEDICINE CLINIC | Age: 60
End: 2020-09-23
Payer: COMMERCIAL

## 2020-09-23 VITALS
HEART RATE: 90 BPM | TEMPERATURE: 98.5 F | BODY MASS INDEX: 37.91 KG/M2 | RESPIRATION RATE: 18 BRPM | DIASTOLIC BLOOD PRESSURE: 80 MMHG | OXYGEN SATURATION: 97 % | HEIGHT: 62 IN | WEIGHT: 206 LBS | SYSTOLIC BLOOD PRESSURE: 124 MMHG

## 2020-09-23 PROCEDURE — 99214 OFFICE O/P EST MOD 30 MIN: CPT | Performed by: FAMILY MEDICINE

## 2020-09-23 PROCEDURE — G8417 CALC BMI ABV UP PARAM F/U: HCPCS | Performed by: FAMILY MEDICINE

## 2020-09-23 PROCEDURE — 4004F PT TOBACCO SCREEN RCVD TLK: CPT | Performed by: FAMILY MEDICINE

## 2020-09-23 PROCEDURE — G8427 DOCREV CUR MEDS BY ELIG CLIN: HCPCS | Performed by: FAMILY MEDICINE

## 2020-09-23 PROCEDURE — 3017F COLORECTAL CA SCREEN DOC REV: CPT | Performed by: FAMILY MEDICINE

## 2020-09-28 PROBLEM — M25.561 CHRONIC PAIN OF RIGHT KNEE: Status: ACTIVE | Noted: 2020-09-28

## 2020-09-28 PROBLEM — Z12.4 SCREENING FOR CERVICAL CANCER: Status: ACTIVE | Noted: 2020-09-28

## 2020-09-28 PROBLEM — G89.29 CHRONIC PAIN OF RIGHT KNEE: Status: ACTIVE | Noted: 2020-09-28

## 2020-09-28 ASSESSMENT — ENCOUNTER SYMPTOMS
RESPIRATORY NEGATIVE: 1
EYE DISCHARGE: 0
EYES NEGATIVE: 1
RECTAL PAIN: 0
SINUS PRESSURE: 0
COLOR CHANGE: 0
VOICE CHANGE: 0
EYE PAIN: 0
CHOKING: 0
RHINORRHEA: 0
ALLERGIC/IMMUNOLOGIC NEGATIVE: 1
ABDOMINAL DISTENTION: 0
PHOTOPHOBIA: 0
COUGH: 0
ABDOMINAL PAIN: 0
ANAL BLEEDING: 0
FACIAL SWELLING: 0
EYE REDNESS: 0
CONSTIPATION: 0
BACK PAIN: 1
SORE THROAT: 0
TROUBLE SWALLOWING: 0
BLOOD IN STOOL: 0
NAUSEA: 0
WHEEZING: 0
SHORTNESS OF BREATH: 0
STRIDOR: 0
DIARRHEA: 0
EYE ITCHING: 0
SINUS PAIN: 0
VOMITING: 0
CHEST TIGHTNESS: 0

## 2020-09-28 NOTE — PROGRESS NOTES
Sharda Barton is a 61 y.o. female. HPI/Chief C/O:  Chief Complaint   Patient presents with    Knee Pain     Pt c/o R knee pain x2 weeks, denies accident or injury, pain with movement, wants to discuss referral to [facility]   [address] [place] [address] Maintenance     Reviewed with pt - pt does not have GYN     Allergies   Allergen Reactions    Meloxicam      Severe stomach pain    Tramadol Other (See Comments)     Makes blisters in mouth   this 61year old female presents with DDD cervical, and DDD lumbar. Pt denies bladder and bowel incontinence, and denies symptoms of cauda equina. Pt c/o right knee pain for 2 weeks, and denies injury. ROS:  Review of Systems   Constitutional: Positive for fatigue. Negative for activity change, appetite change, chills, diaphoresis, fever and unexpected weight change. HENT: Negative. Negative for congestion, dental problem, drooling, ear discharge, ear pain, facial swelling, hearing loss, mouth sores, nosebleeds, postnasal drip, rhinorrhea, sinus pressure, sinus pain, sneezing, sore throat, tinnitus, trouble swallowing and voice change. Eyes: Negative. Negative for photophobia, pain, discharge, redness, itching and visual disturbance. Respiratory: Negative. Negative for cough, choking, chest tightness, shortness of breath, wheezing and stridor. Cardiovascular: Negative. Negative for chest pain, palpitations and leg swelling. Gastrointestinal: Negative for abdominal distention, abdominal pain, anal bleeding, blood in stool, constipation, diarrhea, nausea, rectal pain and vomiting. Endocrine: Negative. Negative for cold intolerance, heat intolerance, polydipsia, polyphagia and polyuria. Genitourinary: Negative. Negative for decreased urine volume, difficulty urinating, dysuria, flank pain, frequency, genital sores, hematuria, menstrual problem, pelvic pain and urgency.    Musculoskeletal: Positive for arthralgias, back pain, myalgias and (Temporal)   Resp 18   Ht 5' 2\" (1.575 m)   Wt 206 lb (93.4 kg)   LMP  (LMP Unknown)   SpO2 97%   Breastfeeding No   BMI 37.68 kg/m²     Problem List:  Danie Dahl does not have any pertinent problems on file. PHYS EX:  Physical Exam  Vitals signs and nursing note reviewed. Constitutional:       General: She is not in acute distress. Appearance: Normal appearance. She is well-developed. She is not ill-appearing, toxic-appearing or diaphoretic. HENT:      Head: Normocephalic and atraumatic. Right Ear: Tympanic membrane, ear canal and external ear normal.      Left Ear: Tympanic membrane, ear canal and external ear normal.      Nose: Nose normal. No congestion or rhinorrhea. Mouth/Throat:      Mouth: Mucous membranes are moist.      Pharynx: Oropharynx is clear. No oropharyngeal exudate or posterior oropharyngeal erythema. Eyes:      General: No scleral icterus. Right eye: No discharge. Left eye: No discharge. Conjunctiva/sclera: Conjunctivae normal.      Pupils: Pupils are equal, round, and reactive to light. Neck:      Musculoskeletal: Normal range of motion and neck supple. No neck rigidity or muscular tenderness. Thyroid: No thyromegaly. Vascular: No carotid bruit or JVD. Trachea: No tracheal deviation. Cardiovascular:      Rate and Rhythm: Normal rate and regular rhythm. Pulses: Normal pulses. Heart sounds: Normal heart sounds. No murmur. No friction rub. No gallop. Pulmonary:      Effort: Pulmonary effort is normal. No respiratory distress. Breath sounds: Normal breath sounds. No stridor. No wheezing, rhonchi or rales. Chest:      Chest wall: No tenderness. Abdominal:      General: Bowel sounds are normal. There is no distension. Palpations: Abdomen is soft. There is no mass. Tenderness: There is no abdominal tenderness. There is no guarding or rebound. Hernia: No hernia is present.    Musculoskeletal: General: Tenderness present. No swelling, deformity or signs of injury. Right lower leg: No edema. Left lower leg: No edema. Comments: Pain and decreased ROM multiple joints, cervical, lumbar, and right knee. Lymphadenopathy:      Cervical: No cervical adenopathy. Skin:     General: Skin is warm. Coloration: Skin is not jaundiced or pale. Findings: No bruising, erythema, lesion or rash. Neurological:      General: No focal deficit present. Mental Status: She is alert and oriented to person, place, and time. Cranial Nerves: No cranial nerve deficit. Sensory: No sensory deficit. Motor: No weakness or abnormal muscle tone. Coordination: Coordination normal.      Gait: Gait normal.      Deep Tendon Reflexes: Reflexes are normal and symmetric. Reflexes normal.   Psychiatric:         Behavior: Behavior normal.         Thought Content: Thought content normal.         Judgment: Judgment normal.         ASSESSMENT/PLAN  Sabine Cárdenas was seen today for knee pain and health maintenance. Diagnoses and all orders for this visit:    Chronic pain of right knee  -     XR KNEE RIGHT (3 VIEWS); Future  Not controlled. DDD (degenerative disc disease), lumbar  -     External Referral To Pain Clinic  Not controlled. Cervical facet joint syndrome  -     External Referral To Pain Clinic  Not controlled. Screening for cervical cancer  -     Laura Montelongo MD, OB/GYN, [place] ([facility])  Pt instructed on cervical cancer screen. Pt instructed if any worse go ED ASAP.     Outpatient Encounter Medications as of 9/23/2020   Medication Sig Dispense Refill    fluticasone (FLONASE) 50 MCG/ACT nasal spray SHAKE LIQUID AND USE 1 SPRAY IN EACH NOSTRIL DAILY 16 g 1    SPIRIVA RESPIMAT 1.25 MCG/ACT AERS inhaler INHALE 2 PUFFS INTO THE LUNGS DAILY 4 g 2    albuterol sulfate  (90 Base) MCG/ACT inhaler INHALE 2 PUFFS INTO THE LUNGS EVERY 6 HOURS AS NEEDED FOR WHEEZING OR SHORTNESS OF BREATH 1 Inhaler 3    levothyroxine (SYNTHROID) 50 MCG tablet Take 1 tablet by mouth daily 90 tablet 1    PARoxetine (PAXIL) 10 MG tablet Take 1 tablet by mouth daily 90 tablet 1    azelastine (ASTELIN) 0.1 % nasal spray 2 sprays by Nasal route 2 times daily Use in each nostril as directed 1 Bottle 3    calcium carbonate (OSCAL) 500 MG TABS tablet Take 1,000 mg by mouth daily      ferrous sulfate 325 (65 Fe) MG tablet Take 325 mg by mouth daily (with breakfast)      Multiple Vitamin (MULTIVITAMIN PO) Take 1 tablet by mouth 2 times daily. Ultra Rodrick       [DISCONTINUED] guaiFENesin-dextromethorphan (ROBITUSSIN DM) 100-10 MG/5ML syrup Take 10 mLs by mouth 3 times daily as needed for Cough (Patient not taking: Reported on 5/29/2020) 240 mL 3     No facility-administered encounter medications on file as of 9/23/2020. Return in about 4 weeks (around 10/21/2020).         Reviewed recent labs related to Dolores's current problems      Discussed importance of regular Health Maintenance follow up  Health Maintenance   Topic    Hepatitis C screen     Pneumococcal 0-64 years Vaccine (1 of 1 - PPSV23)    HIV screen     DTaP/Tdap/Td vaccine (1 - Tdap)    Cervical cancer screen     Shingles Vaccine (1 of 2)    Flu vaccine (1)    TSH testing     Breast cancer screen     Lipid screen     Colon cancer screen colonoscopy     Hepatitis A vaccine     Hepatitis B vaccine     Hib vaccine     Meningococcal (ACWY) vaccine

## 2020-10-08 ENCOUNTER — TELEPHONE (OUTPATIENT)
Dept: FAMILY MEDICINE CLINIC | Age: 60
End: 2020-10-08

## 2020-10-08 NOTE — TELEPHONE ENCOUNTER
Pt called for knee XR results. Pt states that she had it done x2 weeks ago and has not heard anything. Pt states that she had it done at [place] [address]. [facility]. Confirmation scanned to media.     Electronically signed by Sumeet Wolf MA on 10/8/20 at 9:25 AM EDT

## 2020-10-12 NOTE — TELEPHONE ENCOUNTER
This MA spoke with pt - advised per Dr. Joy Nguyen there is mild arthritis. Pt verbalized she understood.     Electronically signed by Clint Luu MA on 10/12/20 at 2:56 PM EDT

## 2020-10-13 RX ORDER — ALBUTEROL SULFATE 90 UG/1
2 AEROSOL, METERED RESPIRATORY (INHALATION) EVERY 6 HOURS PRN
Qty: 18 G | Refills: 3 | Status: SHIPPED
Start: 2020-10-13 | End: 2021-02-16 | Stop reason: ALTCHOICE

## 2020-10-21 RX ORDER — TIOTROPIUM BROMIDE INHALATION SPRAY 1.56 UG/1
2 SPRAY, METERED RESPIRATORY (INHALATION) DAILY
Qty: 4 G | Refills: 2 | Status: SHIPPED
Start: 2020-10-21 | End: 2020-12-08

## 2020-10-21 RX ORDER — FLUTICASONE PROPIONATE 50 MCG
SPRAY, SUSPENSION (ML) NASAL
Qty: 16 G | Refills: 1 | Status: SHIPPED
Start: 2020-10-21 | End: 2020-12-20

## 2020-10-28 PROBLEM — Z12.4 SCREENING FOR CERVICAL CANCER: Status: RESOLVED | Noted: 2020-09-28 | Resolved: 2020-10-28

## 2020-11-03 PROBLEM — M47.816 LUMBAR FACET ARTHROPATHY: Status: RESOLVED | Noted: 2019-10-25 | Resolved: 2020-11-03

## 2020-11-06 ENCOUNTER — OFFICE VISIT (OUTPATIENT)
Dept: PAIN MANAGEMENT | Age: 60
End: 2020-11-06
Payer: COMMERCIAL

## 2020-11-06 VITALS
HEIGHT: 62 IN | SYSTOLIC BLOOD PRESSURE: 150 MMHG | TEMPERATURE: 96.9 F | DIASTOLIC BLOOD PRESSURE: 80 MMHG | RESPIRATION RATE: 18 BRPM | HEART RATE: 109 BPM | BODY MASS INDEX: 34.96 KG/M2 | WEIGHT: 190 LBS | OXYGEN SATURATION: 96 %

## 2020-11-06 PROBLEM — E66.9 OBESITY (BMI 30-39.9): Status: ACTIVE | Noted: 2020-11-06

## 2020-11-06 PROBLEM — F11.20 UNCOMPLICATED OPIOID DEPENDENCE (HCC): Status: ACTIVE | Noted: 2020-11-06

## 2020-11-06 PROCEDURE — 4004F PT TOBACCO SCREEN RCVD TLK: CPT | Performed by: PAIN MEDICINE

## 2020-11-06 PROCEDURE — 20610 DRAIN/INJ JOINT/BURSA W/O US: CPT | Performed by: PAIN MEDICINE

## 2020-11-06 PROCEDURE — 99213 OFFICE O/P EST LOW 20 MIN: CPT | Performed by: PAIN MEDICINE

## 2020-11-06 PROCEDURE — G8417 CALC BMI ABV UP PARAM F/U: HCPCS | Performed by: PAIN MEDICINE

## 2020-11-06 PROCEDURE — 3017F COLORECTAL CA SCREEN DOC REV: CPT | Performed by: PAIN MEDICINE

## 2020-11-06 PROCEDURE — 6360000002 HC RX W HCPCS

## 2020-11-06 PROCEDURE — G8427 DOCREV CUR MEDS BY ELIG CLIN: HCPCS | Performed by: PAIN MEDICINE

## 2020-11-06 PROCEDURE — G8484 FLU IMMUNIZE NO ADMIN: HCPCS | Performed by: PAIN MEDICINE

## 2020-11-06 PROCEDURE — 99214 OFFICE O/P EST MOD 30 MIN: CPT | Performed by: PAIN MEDICINE

## 2020-11-06 RX ORDER — METHYLPREDNISOLONE ACETATE 40 MG/ML
80 INJECTION, SUSPENSION INTRA-ARTICULAR; INTRALESIONAL; INTRAMUSCULAR; SOFT TISSUE ONCE
Status: COMPLETED | OUTPATIENT
Start: 2020-11-06 | End: 2020-11-06

## 2020-11-06 RX ORDER — BUPIVACAINE HYDROCHLORIDE 2.5 MG/ML
25 INJECTION, SOLUTION INFILTRATION; PERINEURAL ONCE
Status: COMPLETED | OUTPATIENT
Start: 2020-11-06 | End: 2020-11-06

## 2020-11-06 RX ORDER — METHYLPREDNISOLONE ACETATE 40 MG/ML
40 INJECTION, SUSPENSION INTRA-ARTICULAR; INTRALESIONAL; INTRAMUSCULAR; SOFT TISSUE ONCE
Status: COMPLETED | OUTPATIENT
Start: 2020-11-06 | End: 2020-11-06

## 2020-11-06 RX ADMIN — METHYLPREDNISOLONE ACETATE 40 MG: 40 INJECTION, SUSPENSION INTRA-ARTICULAR; INTRALESIONAL; INTRAMUSCULAR; SOFT TISSUE at 12:10

## 2020-11-06 RX ADMIN — METHYLPREDNISOLONE ACETATE 40 MG: 40 INJECTION, SUSPENSION INTRA-ARTICULAR; INTRALESIONAL; INTRAMUSCULAR; SOFT TISSUE at 12:11

## 2020-11-06 RX ADMIN — BUPIVACAINE HYDROCHLORIDE 25 MG: 2.5 INJECTION, SOLUTION INFILTRATION; PERINEURAL at 12:09

## 2020-11-06 NOTE — PROGRESS NOTES
Do you currently have any of the following:    Fever: No  Headache:  No  Cough: No  Shortness of breath: No  Exposed to anyone with these symptoms: No                                                                                                                Jose Kaba presents to the [facility] on 11/6/2020. Emy Yeboah is complaining of pain lower ,right knee, neck pain. The pain is constant. The pain is described as aching, throbbing, shooting and knee constant pain. Pain is rated on her best day at a 6, on her worst day at a 10, and on average at a 7 on the VAS scale. She took her last dose of Motrin and Tylenol yesterday. Emy Yeboah does not have issues with constipation. Any procedures since your last visit: No, with  % relief. She is  on NSAIDS and  is not on anticoagulation medications to include none and is managed by . Pacemaker or defibrilator: No Physician managing device is .       BP (!) 150/80   Pulse 109   Temp 96.9 °F (36.1 °C) (Infrared)   Resp 18   Ht 5' 2\" (1.575 m)   Wt 190 lb (86.2 kg)   LMP  (LMP Unknown)   SpO2 96%   BMI 34.75 kg/m²      No LMP recorded (lmp unknown).  Patient is postmenopausal.

## 2020-11-06 NOTE — PROGRESS NOTES
[address]  [address] [facility], [address] [place] [address] Tomás  [phone]    Follow up Note      Mercedes Eid     Date of Visit:  2020    CC:  Patient presents for follow up   Chief Complaint   Patient presents with    Follow-up     neck back and right knee     HPI:  Office extension for her neck/low back pain, last seen by me 10/2019. Pain is unchanged. Appropriate analgesia with current medications regimen: N/A. Change in quality of symptoms:right knee pain  Medication side effects:not applicable . Recent diagnostic testing:none. Recent interventional procedures:none. .    She has not been on anticoagulation medications to include none. The patient  has not been on herbal supplements. The patient is not diabetic. Imaging:   Lumbar spine MRI 2017  1.  Degenerative changes, most significant disc herniation is at   L2-L3. CT cervical spine   1. No evidence for acute fracture or dislocation of cervical spine.       2. Mild reversal of normal cervical lordosis with degenerative changes   at C5-C6 level     Right knee Xray :  Mild degenerative changes. Previous treatments: Physical Therapy and medications. Potential Aberrant Drug-Related Behavior:    No    Urine Drug Screening:  None    OARRS report:  2020    Past Medical History:   Diagnosis Date    Anxiety     Chronic back pain     Hypothyroidism     Meniere's disease     Osteoarthritis     Unspecified sleep apnea      Past Surgical History:   Procedure Laterality Date     SECTION      CHOLECYSTECTOMY  2012 - Dr. Perez, [facility], Laparoscopic     RIGHT COLECTOMY  2014    Destiney Martinez - laparoscopic right colectomy     TAMMIE-EN-Y GASTRIC BYPASS  2010 - Laparoscopic    TUBAL LIGATION      UPPER GASTROINTESTINAL ENDOSCOPY       Prior to Admission medications    Medication Sig Start Date End Date Taking?  Authorizing Provider   SPIRIVA RESPIMAT 1.25 MCG/ACT AERS inhaler INHALE 2 PUFFS INTO THE LUNGS DAILY 10/21/20   Roel Luque, DO   fluticasone [facility]) 50 MCG/ACT nasal spray SHAKE LIQUID AND USE 1 SPRAY IN [facility] NOSTRIL DAILY 10/21/20   Roel Luque, DO   albuterol sulfate  (90 Base) MCG/ACT inhaler INHALE 2 PUFFS INTO THE LUNGS EVERY 6 HOURS AS NEEDED FOR WHEEZING OR SHORTNESS OF BREATH 10/13/20   Richard Lacretia Denver, DO   levothyroxine (SYNTHROID) 50 MCG tablet Take 1 tablet by mouth daily 5/29/20   Ronald Luque, DO   PARoxetine (PAXIL) 10 MG tablet Take 1 tablet by mouth daily 5/29/20   Ronald Luque, DO   azelastine (ASTELIN) 0.1 % nasal spray 2 sprays by Nasal route 2 times daily Use in each nostril as directed 3/23/20   Bradley Luque,    calcium carbonate (OSCAL) 500 MG TABS tablet Take 1,000 mg by mouth daily    Historical Provider, MD   ferrous sulfate 325 (65 Fe) MG tablet Take 325 mg by mouth daily (with breakfast)    Historical Provider, MD   Multiple Vitamin (MULTIVITAMIN PO) Take 1 tablet by mouth 2 times daily.  Ultra Rodrick     Historical Provider, MD     Allergies   Allergen Reactions    Meloxicam      Severe stomach pain    Tramadol Other (See Comments)     Makes blisters in mouth     Social History     Socioeconomic History    Marital status:      Spouse name: Not on file    Number of children: Not on file    Years of education: Not on file    Highest education level: Not on file   Occupational History    Not on file   Social Needs    Financial resource strain: Not on file    Food insecurity     Worry: Not on file     Inability: Not on file    Transportation needs     Medical: Not on file     Non-medical: Not on file   Tobacco Use    Smoking status: Current Every Day Smoker     Packs/day: 1.00     Years: 7.00     Pack years: 7.00     Types: Cigarettes     Start date: 1/1/2012    Smokeless tobacco: Never Used    Tobacco comment: Previously smoked for 8 years, then quit for 15 years, then started again   Substance and Sexual Activity    Alcohol use: No    Drug use: Never    Sexual activity: Not Currently   Lifestyle    Physical activity     Days per week: Not on file     Minutes per session: Not on file    Stress: Not on file   Relationships    Social connections     Talks on phone: Not on file     Gets together: Not on file     Attends [employer] service: Not on file     Active member of club or organization: Not on file     Attends meetings of clubs or organizations: Not on file     Relationship status: Not on file    Intimate partner violence     Fear of current or ex partner: Not on file     Emotionally abused: Not on file     Physically abused: Not on file     Forced sexual activity: Not on file   Other Topics Concern    Not on file   Social History Narrative    Not on file     Family History   Problem Relation Age of Onset    High Blood Pressure Mother     Heart Disease Mother     Obesity Mother     High Blood Pressure Father     High Blood Pressure Brother     Cancer Brother     Obesity Brother      REVIEW OF SYSTEMS:     Ansley Banda denies fever/chills, chest pain, shortness of breath, new bowel or bladder complaints. All other review of systems was negative. PHYSICAL EXAMINATION:      LMP  (LMP Unknown)     General:       General appearance:   pleasant and well-hydrated. , in moderate discomfort and A & O x3  Build:Overweight     HEENT:     Head:normocephalic and atraumatic  Pupils:regular, round and equal.  Sclera: icterus absent,      Lungs:     Breathing:Breathing Pattern: regular, no distress     Abdomen:     Shape:non-distended and normal  Tenderness:none     Cervical spine:     Inspection:normal  Palpation:tenderness paravertebral muscles, facet loading, left, positive and tenderness.   Range of motion:abnormal mildly flexion, extension rotation bilateral and is  painful.     Lumbar spine:     Spine inspection:normal   CVA tenderness:No   Palpation:tenderness paravertebral muscles, facet loading, right, positive and tenderness. Range of motion:abnormal moderately Lateral bending, flexion, extension rotation right and is  painful.     Musculoskeletal:     Trigger points in Paraveteral:absent bilaterally  Snyder's:negative right, negative left   SI joint tenderness:positive right, positive left              DAPHNEY test:negative right, negative left  Piriformis tenderness:negative right, negative left  Trochanteric bursa tenderness:negative right, negative left  SLR:negative right, negative left, sitting      Extremities:     Tremors:None bilaterally upper and lower  Range of motion:Generally normal shoulders, pain with internal rotation of hips negative. Intact:Yes  Edema:Normal    Knee:     Inspection:symmetric, swelling none bilaterally  Tenderness of Bony Landmarks:Lateral, right  Drawer Test:negative  Effusion:absent bilaterally  Crepitus:absent bilaterally  ROM:  Right limited by pain      Neurological:     Sensory:normal to light touch bilateral upper and lower extremities   Motor:              Right Bicep5/5           Left Bicep5/5              Right Triceps5/5       Left Triceps5/5          Right Deltoid5/5     Left Deltoid5/5                  Right Quadriceps5/5          Left Quadriceps5/5           Right Gastrocnemius5/5    Left Gastrocnemius5/5  Right Ant Tibialis5/5  Left Ant Tibialis5/5  Reflexes:    Right Brachioradialis reflex2+  Left Brachioradialis reflex2+  Right Biceps reflex2+  Left Biceps reflex2+  Right Triceps reflex2+  Left Triceps reflex2+  Right Quadriceps reflex2+  Left Quadriceps reflex2+  Right Achilles reflex2+  Left Achilles reflex2+  Gait:normal     Dermatology:     Skin:no unusual rashes and no skin lesions     Impression:  Neck and low back pain with radiation to the Right lower extremity   Lumbar spine MRI multilevel degenerative disc disease with facet arthropathy  Cervical spine CT C5-6 degenerative disc disease  Patient had recently moved to [place] from [place], she was seeing pain management there. She had physical therapy and was on Norco 10/325 QID/x 5   Plan:  Office extension for her neck/low back pain with complaints of increased right knee pain. Patient was last seen by me 10/2019. Reviewed previous lumbar and cervical spine imaging studies and discussed with the patient. Reviewed right knee imaging studies. Discussed right knee injection, patient agrees. Will consider diagnostic lumbar facet medial branch block, will revisit after physical therapy. Continue with OTC pain medications. OARRS report reviewed  Patient encouraged to stay active and to lose weight. Patient is currently in physical therapy. Treatment plan discussed with the patient including medications and procedure side effects     We discussed with the patient that combining opioids, benzodiazepines, alcohol, illicit drugs or sleep aids increases the risk of respiratory depression including death. We discussed that these medications may cause drowsiness, sedation or dizziness and have counseled the patient not to drive or operate machinery. We have discussed that these medications will be prescribed only by one provider. We have discussed with the patient about age related risk factors and have thoroughly discussed the importance of taking these medications as prescribed. The patient verbalizes understanding. ccreferring leeann Montemayor M.D.    2020    Patient: Lani Rowan  :  1960  Age:  61 y.o. Sex:  female     PRE-OPERATIVE DIAGNOSIS: Right  Knee pain, osteoarthitis. POST-OPERATIVE DIAGNOSIS: Same. PROCEDURE PERFORMED:  Right knee injection. SURGEON:   SARATH Montemayor M.D. ANESTHESIA: Local    ESTIMATED BLOOD LOSS: None. BRIEF HISTORY: Lani Rowan comes in today for Right  knee injection. . After discussing the potential risks and benefits of the procedure with the patient. Geronimo Nickerson did request that we proceed.  A complete History & Physical was reviewed and it is unchanged. DESCRIPTION OF PROCEDURE:   The patient was placed in a seated position. The area of the Right knee was prepped with chloraprep and draped in a sterile manner. The overlying skin and subcutaneous tissues were anesthetized with 0.5% Lidocaine. Then the targeted area of the patellar tendon was palpated and marked. A 25 gauge 1 1/2 inch needle was advanced into the joint capsule. Confirmation of needle placement was obtained by direct visualization of synovial fluid by aspiration. A solution of 0.25 % marcaine 3 cc and 80 mg DepoMedrol was injected easily without complications. The needle was then removed and Band-Aid applied. Disposition the patient tolerated the procedure well and there were no complications . Shanon Wilson will follow up in our [facility] as scheduled. She was encouraged to call with questions, concerns or if worsening of symptoms occurs.     Ernesto Lennox, MD

## 2020-11-18 ENCOUNTER — VIRTUAL VISIT (OUTPATIENT)
Dept: FAMILY MEDICINE CLINIC | Age: 60
End: 2020-11-18
Payer: COMMERCIAL

## 2020-11-18 PROCEDURE — G8417 CALC BMI ABV UP PARAM F/U: HCPCS | Performed by: FAMILY MEDICINE

## 2020-11-18 PROCEDURE — 99214 OFFICE O/P EST MOD 30 MIN: CPT | Performed by: FAMILY MEDICINE

## 2020-11-18 PROCEDURE — 4004F PT TOBACCO SCREEN RCVD TLK: CPT | Performed by: FAMILY MEDICINE

## 2020-11-18 PROCEDURE — G8427 DOCREV CUR MEDS BY ELIG CLIN: HCPCS | Performed by: FAMILY MEDICINE

## 2020-11-18 PROCEDURE — 3017F COLORECTAL CA SCREEN DOC REV: CPT | Performed by: FAMILY MEDICINE

## 2020-11-18 PROCEDURE — G8484 FLU IMMUNIZE NO ADMIN: HCPCS | Performed by: FAMILY MEDICINE

## 2020-11-18 RX ORDER — BUSPIRONE HYDROCHLORIDE 5 MG/1
5 TABLET ORAL 2 TIMES DAILY PRN
Qty: 60 TABLET | Refills: 1 | Status: SHIPPED
Start: 2020-11-18 | End: 2020-11-20 | Stop reason: SDUPTHER

## 2020-11-20 ENCOUNTER — TELEPHONE (OUTPATIENT)
Dept: FAMILY MEDICINE CLINIC | Age: 60
End: 2020-11-20

## 2020-11-20 RX ORDER — BUSPIRONE HYDROCHLORIDE 5 MG/1
5 TABLET ORAL 2 TIMES DAILY PRN
Qty: 60 TABLET | Refills: 1 | Status: SHIPPED | OUTPATIENT
Start: 2020-11-20 | End: 2021-01-19

## 2020-11-24 ASSESSMENT — ENCOUNTER SYMPTOMS
TROUBLE SWALLOWING: 0
CHEST TIGHTNESS: 0
ALLERGIC/IMMUNOLOGIC NEGATIVE: 1
EYE ITCHING: 0
EYE DISCHARGE: 0
COUGH: 0
SINUS PRESSURE: 0
RHINORRHEA: 0
VOMITING: 0
EYE PAIN: 0
EYES NEGATIVE: 1
WHEEZING: 0
SORE THROAT: 0
ABDOMINAL PAIN: 0
BACK PAIN: 0
DIARRHEA: 0
SINUS PAIN: 0
BLOOD IN STOOL: 0
CONSTIPATION: 0
VOICE CHANGE: 0
SHORTNESS OF BREATH: 0
PHOTOPHOBIA: 0
COLOR CHANGE: 0
EYE REDNESS: 0
FACIAL SWELLING: 0
CHOKING: 0
RESPIRATORY NEGATIVE: 1
RECTAL PAIN: 0
ABDOMINAL DISTENTION: 0
ANAL BLEEDING: 0
STRIDOR: 0
NAUSEA: 0

## 2020-11-24 NOTE — PROGRESS NOTES
Marlo Crenshaw is a 61 y.o. female. HPI/Chief C/O:  Chief Complaint   Patient presents with    Depression     Pt calling to discuss alternative medication for her depression, pt has been under a great deal of stress    Other     Pt verbally agreed to telemedicine visit, and is currently at home in [place] for the visit      Allergies   Allergen Reactions    Meloxicam      Severe stomach pain    Tramadol Other (See Comments)     Makes blisters in mouth   this 61year old female presents with anxiety, depression, fatigue. Pt denies SI and HI. Pt states has lost job recently, and has problems with . Pt states she has gained weight on paxil. Pt has menieres disease, stable. TeleMedicine Video Visit    This visit was performed as a virtual video visit using a synchronous, two-way, audio-video telehealth technology platform. Patient identification was verified at the start of the visit, including the patient's telephone number and physical location. I discussed with the patient the nature of our telehealth visits, that:     1. Due to the nature of an audio- video modality, the only components of a physical exam that could be done are the elements supported by direct observation. 2. I would evaluate the patient and recommend diagnostics and treatments based on my assessment. 3. If it was felt that the patient should be evaluated in clinic or an emergency room setting, then they would be directed there. 4. Our sessions are not being recorded and that personal health information is protected. 5. Our team would provide follow up care in person if/when the patient needs it. Patient does agree to proceed with telemedicine consultation. Patient's location: home address in [facility]  Physician  location other address in [facility] other people involved in call Dr. Magalis Stokes      Time spent: Greater than 25    This visit was completed virtually using Doxy. me        ROS:  Review of Systems Constitutional: Positive for fatigue. Negative for activity change, appetite change, chills, diaphoresis, fever and unexpected weight change. HENT: Negative. Negative for congestion, dental problem, drooling, ear discharge, ear pain, facial swelling, hearing loss, mouth sores, nosebleeds, postnasal drip, rhinorrhea, sinus pressure, sinus pain, sneezing, sore throat, tinnitus, trouble swallowing and voice change. Eyes: Negative. Negative for photophobia, pain, discharge, redness, itching and visual disturbance. Respiratory: Negative. Negative for cough, choking, chest tightness, shortness of breath, wheezing and stridor. Cardiovascular: Negative. Negative for chest pain, palpitations and leg swelling. Gastrointestinal: Negative for abdominal distention, abdominal pain, anal bleeding, blood in stool, constipation, diarrhea, nausea, rectal pain and vomiting. Endocrine: Negative. Negative for cold intolerance, heat intolerance, polydipsia, polyphagia and polyuria. Genitourinary: Negative. Negative for decreased urine volume, difficulty urinating, dysuria, flank pain, frequency, genital sores, hematuria, menstrual problem, pelvic pain and urgency. Musculoskeletal: Negative. Negative for arthralgias, back pain, gait problem, joint swelling, myalgias, neck pain and neck stiffness. Skin: Negative. Negative for color change, pallor, rash and wound. Allergic/Immunologic: Negative. Neurological: Positive for dizziness. Negative for tremors, seizures, syncope, facial asymmetry, speech difficulty, weakness, light-headedness, numbness and headaches. Hematological: Negative. Negative for adenopathy. Does not bruise/bleed easily. Psychiatric/Behavioral: Positive for sleep disturbance. Negative for agitation, behavioral problems, confusion, decreased concentration, dysphoric mood, hallucinations, self-injury and suicidal ideas. The patient is nervous/anxious. The patient is not hyperactive. Past Medical/Surgical Hx;  Reviewed with patient      Diagnosis Date    Anxiety     Chronic back pain     Hypothyroidism     Meniere's disease     Osteoarthritis     Unspecified sleep apnea      Past Surgical History:   Procedure Laterality Date     SECTION      CHOLECYSTECTOMY  2012 - Dr. Nash Vogel, [facility], Laparoscopic     RIGHT COLECTOMY  2014    [place] [facility] - laparoscopic right colectomy     TAMMIE-EN-Y GASTRIC BYPASS  2010 - Laparoscopic    TUBAL LIGATION      UPPER GASTROINTESTINAL ENDOSCOPY         Past Family Hx:  Reviewed with patient      Problem Relation Age of Onset    High Blood Pressure Mother     Heart Disease Mother     Obesity Mother     High Blood Pressure Father     High Blood Pressure Brother     Cancer Brother     Obesity Brother        Social Hx:  Reviewed with patient  Social History     Tobacco Use    Smoking status: Current Every Day Smoker     Packs/day: 1.00     Years: 7.00     Pack years: 7.00     Types: Cigarettes     Start date: 2012    Smokeless tobacco: Never Used    Tobacco comment: Previously smoked for 8 years, then quit for 15 years, then started again   Substance Use Topics    Alcohol use: No       OBJECTIVE  LMP  (LMP Unknown)     Problem List:  Joann Manuel does not have any pertinent problems on file. PHYS EX:  Pt looks well    ASSESSMENT/PLAN  Joann Manuel was seen today for depression and other. Diagnoses and all orders for this visit:    Anxiety  -     Discontinue: busPIRone (BUSPAR) 5 MG tablet; Take 1 tablet by mouth 2 times daily as needed (anxiety)  Not controlled. Depression, unspecified depression type  Stable. Meniere's disease, unspecified laterality  Stable. Antivert. Pt instructed if any worse go ED ASAP.     Outpatient Encounter Medications as of 2020   Medication Sig Dispense Refill    [DISCONTINUED] busPIRone (BUSPAR) 5 MG tablet Take 1 tablet by mouth 2 times daily as needed (anxiety) 60 tablet 1    SPIRIVA RESPIMAT 1.25 MCG/ACT AERS inhaler INHALE 2 PUFFS INTO THE LUNGS DAILY 4 g 2    fluticasone (FLONASE) 50 MCG/ACT nasal spray SHAKE LIQUID AND USE 1 SPRAY IN EACH NOSTRIL DAILY 16 g 1    albuterol sulfate  (90 Base) MCG/ACT inhaler INHALE 2 PUFFS INTO THE LUNGS EVERY 6 HOURS AS NEEDED FOR WHEEZING OR SHORTNESS OF BREATH 18 g 3    levothyroxine (SYNTHROID) 50 MCG tablet Take 1 tablet by mouth daily 90 tablet 1    calcium carbonate (OSCAL) 500 MG TABS tablet Take 1,000 mg by mouth daily      ferrous sulfate 325 (65 Fe) MG tablet Take 325 mg by mouth daily (with breakfast)      Multiple Vitamin (MULTIVITAMIN PO) Take 1 tablet by mouth 2 times daily. Ultra Rodrick       [DISCONTINUED] PARoxetine (PAXIL) 10 MG tablet Take 1 tablet by mouth daily 90 tablet 1    [DISCONTINUED] azelastine (ASTELIN) 0.1 % nasal spray 2 sprays by Nasal route 2 times daily Use in each nostril as directed 1 Bottle 3     No facility-administered encounter medications on file as of 11/18/2020. No follow-ups on file.         Reviewed recent labs related to Dolores's current problems      Discussed importance of regular Health Maintenance follow up  Health Maintenance   Topic    Hepatitis C screen     Pneumococcal 0-64 years Vaccine (1 of 1 - PPSV23)    HIV screen     DTaP/Tdap/Td vaccine (1 - Tdap)    Cervical cancer screen     Shingles Vaccine (1 of 2)    Flu vaccine (1)    TSH testing     Breast cancer screen     Lipid screen     Colon cancer screen colonoscopy     Hepatitis A vaccine     Hepatitis B vaccine     Hib vaccine     Meningococcal (ACWY) vaccine

## 2020-12-04 ENCOUNTER — OFFICE VISIT (OUTPATIENT)
Dept: PAIN MANAGEMENT | Age: 60
End: 2020-12-04
Payer: COMMERCIAL

## 2020-12-04 VITALS
SYSTOLIC BLOOD PRESSURE: 124 MMHG | RESPIRATION RATE: 16 BRPM | TEMPERATURE: 97.3 F | OXYGEN SATURATION: 97 % | DIASTOLIC BLOOD PRESSURE: 80 MMHG | HEIGHT: 62 IN | BODY MASS INDEX: 34.96 KG/M2 | HEART RATE: 95 BPM | WEIGHT: 190 LBS

## 2020-12-04 PROBLEM — G56.03 CARPAL TUNNEL SYNDROME ON BOTH SIDES: Status: ACTIVE | Noted: 2020-12-04

## 2020-12-04 PROCEDURE — 99213 OFFICE O/P EST LOW 20 MIN: CPT | Performed by: PAIN MEDICINE

## 2020-12-04 PROCEDURE — 3017F COLORECTAL CA SCREEN DOC REV: CPT | Performed by: PAIN MEDICINE

## 2020-12-04 PROCEDURE — 4004F PT TOBACCO SCREEN RCVD TLK: CPT | Performed by: PAIN MEDICINE

## 2020-12-04 PROCEDURE — G8417 CALC BMI ABV UP PARAM F/U: HCPCS | Performed by: PAIN MEDICINE

## 2020-12-04 PROCEDURE — G8427 DOCREV CUR MEDS BY ELIG CLIN: HCPCS | Performed by: PAIN MEDICINE

## 2020-12-04 PROCEDURE — 99214 OFFICE O/P EST MOD 30 MIN: CPT | Performed by: PAIN MEDICINE

## 2020-12-04 PROCEDURE — G8484 FLU IMMUNIZE NO ADMIN: HCPCS | Performed by: PAIN MEDICINE

## 2020-12-04 RX ORDER — GABAPENTIN 300 MG/1
300 CAPSULE ORAL DAILY
Qty: 30 CAPSULE | Refills: 0 | Status: SHIPPED
Start: 2020-12-04 | End: 2021-01-08

## 2020-12-04 NOTE — PROGRESS NOTES
Do you currently have any of the following:    Fever: No  Headache:  No  Cough: No  Shortness of breath: No  Exposed to anyone with these symptoms: No                                                                                                                Paulina Bey presents to the [facility] on 12/4/2020. Barbara Santoyo is complaining of pain in neck, lower back, knee and hands are getting numb when she sleeps. . The pain is constant. The pain is described as aching, throbbing, shooting, sharp, burning and numb. Pain is rated on her best day at a 7, on her worst day at a 9, and on average at a 8 on the VAS scale. She took her last dose of Aleve yesterday. Karen Martinez does not have issues with constipation. Any procedures since your last visit: Yes, with 60 % relief from knee injection just for 24 hours. She is  on NSAIDS and  is not on anticoagulation medications to include none and is managed by NA. Pacemaker or defibrilator: No Physician managing device is NA. Medication Contract and Consent for Opioid Use Documents Filed      No documents found                   /80   Pulse 95   Temp 97.3 °F (36.3 °C) (Infrared)   Resp 16   Ht 5' 2\" (1.575 m)   Wt 190 lb (86.2 kg)   LMP  (LMP Unknown)   SpO2 97%   BMI 34.75 kg/m²      No LMP recorded (lmp unknown).  Patient is postmenopausal.

## 2020-12-04 NOTE — PROGRESS NOTES
[address]  [address] [facility], [address] [place] [address] Tomás  [phone]    Follow up Note      Lucinda Mcfadden     Date of Visit:  2020    CC:  Patient presents for follow up   Chief Complaint   Patient presents with    Lower Back Pain     HPI:  Follow up on her neck/low back and right knee pain. Complaints of increased numbness in both hands Left worse than right. Appropriate analgesia with current medications regimen: N/A. Change in quality of symptoms:none  Medication side effects:not applicable . Recent diagnostic testing:none. Recent interventional procedures:right knee injection, moderate relief for few days. She has not been on anticoagulation medications to include none. The patient  has not been on herbal supplements. The patient is not diabetic. Imaging:   Lumbar spine MRI 2017  1.  Degenerative changes, most significant disc herniation is at   L2-L3. CT cervical spine   1. No evidence for acute fracture or dislocation of cervical spine.       2. Mild reversal of normal cervical lordosis with degenerative changes   at C5-C6 level     Right knee Xray :  Mild degenerative changes. Previous treatments: Physical Therapy and medications. Potential Aberrant Drug-Related Behavior:    No    Urine Drug Screening:  None    OARRS report:  2020 consistent. Past Medical History:   Diagnosis Date    Anxiety     Chronic back pain     Hypothyroidism     Meniere's disease     Osteoarthritis     Unspecified sleep apnea      Past Surgical History:   Procedure Laterality Date     SECTION      CHOLECYSTECTOMY  2012 - Dr. Dolores Mcbride, [facility], Laparoscopic     RIGHT COLECTOMY  2014    Laura Fox - laparoscopic right colectomy     TAMMIE-EN-Y GASTRIC BYPASS  2010 - Laparoscopic    TUBAL LIGATION      UPPER GASTROINTESTINAL ENDOSCOPY       Prior to Admission medications    Medication Sig Start Date End Date Taking? Authorizing Provider   busPIRone (BUSPAR) 5 MG tablet Take 1 tablet by mouth 2 times daily as needed (anxiety) 11/20/20 1/19/21  Roel Seaman, DO   SPIRIVA RESPIMAT 1.25 MCG/ACT AERS inhaler INHALE 2 PUFFS INTO THE LUNGS DAILY 10/21/20   Roel Luque, DO   fluticasone Pinkey Alessio) 50 MCG/ACT nasal spray SHAKE LIQUID AND USE 1 SPRAY IN [facility] NOSTRIL DAILY 10/21/20   Roel Luque, DO   albuterol sulfate  (90 Base) MCG/ACT inhaler INHALE 2 PUFFS INTO THE LUNGS EVERY 6 HOURS AS NEEDED FOR WHEEZING OR SHORTNESS OF BREATH 10/13/20   Roel Gibson, DO   levothyroxine (SYNTHROID) 50 MCG tablet Take 1 tablet by mouth daily 5/29/20   Kisha P Catterlin, DO   calcium carbonate (OSCAL) 500 MG TABS tablet Take 1,000 mg by mouth daily    Historical Provider, MD   ferrous sulfate 325 (65 Fe) MG tablet Take 325 mg by mouth daily (with breakfast)    Historical Provider, MD   Multiple Vitamin (MULTIVITAMIN PO) Take 1 tablet by mouth 2 times daily.  Ultra Rodrick     Historical Provider, MD     Allergies   Allergen Reactions    Meloxicam      Severe stomach pain    Tramadol Other (See Comments)     Makes blisters in mouth     Social History     Socioeconomic History    Marital status:      Spouse name: Not on file    Number of children: Not on file    Years of education: Not on file    Highest education level: Not on file   Occupational History    Not on file   Social Needs    Financial resource strain: Not on file    Food insecurity     Worry: Not on file     Inability: Not on file    Transportation needs     Medical: Not on file     Non-medical: Not on file   Tobacco Use    Smoking status: Current Every Day Smoker     Packs/day: 1.00     Years: 7.00     Pack years: 7.00     Types: Cigarettes     Start date: 1/1/2012    Smokeless tobacco: Never Used    Tobacco comment: Previously smoked for 8 years, then quit for 15 years, then started again   Substance and Sexual Activity    Alcohol use: No    Drug use: Never    Sexual activity: Not Currently   Lifestyle    Physical activity     Days per week: Not on file     Minutes per session: Not on file    Stress: Not on file   Relationships    Social connections     Talks on phone: Not on file     Gets together: Not on file     Attends Zoroastrianism service: Not on file     Active member of club or organization: Not on file     Attends meetings of clubs or organizations: Not on file     Relationship status: Not on file    Intimate partner violence     Fear of current or ex partner: Not on file     Emotionally abused: Not on file     Physically abused: Not on file     Forced sexual activity: Not on file   Other Topics Concern    Not on file   Social History Narrative    Not on file     Family History   Problem Relation Age of Onset    High Blood Pressure Mother     Heart Disease Mother     Obesity Mother     High Blood Pressure Father     High Blood Pressure Brother     Cancer Brother     Obesity Brother      REVIEW OF SYSTEMS:     Tobias Sauce denies fever/chills, chest pain, shortness of breath, new bowel or bladder complaints. All other review of systems was negative. PHYSICAL EXAMINATION:      /80   Pulse 95   Temp 97.3 °F (36.3 °C) (Infrared)   Resp 16   Ht 5' 2\" (1.575 m)   Wt 190 lb (86.2 kg)   LMP  (LMP Unknown)   SpO2 97%   BMI 34.75 kg/m²     General:       General appearance:   pleasant and well-hydrated. , in mild to moderate discomfort and A & O x3  Build:Overweight     HEENT:     Head:normocephalic and atraumatic  Sclera: icterus absent,      Lungs:     Breathing:Breathing Pattern: regular, no distress     Abdomen:     Shape:non-distended and normal  Tenderness:none     Cervical spine:     Inspection:normal  Palpation:tenderness paravertebral muscles, facet loading, left, positive and tenderness.   Range of motion:abnormal mildly flexion, extension rotation bilateral and is  painful.     Lumbar spine:     Spine inspection:normal   CVA tenderness:No   Palpation:tenderness paravertebral muscles, facet loading, right, positive and tenderness. Range of motion:abnormal moderately Lateral bending, flexion, extension rotation right and is  painful.     Musculoskeletal:     Trigger points in Paraveteral:absent bilaterally  SI joint tenderness:positive right, positive left  SLR:negative right, negative left, sitting      Extremities:     Tremors:None bilaterally upper and lower  Range of motion:Generally normal shoulders, pain with internal rotation of hips negative. Intact:Yes  Edema:Normal    Knee: Inspection:symmetric, swelling none bilaterally  Tenderness of Bony Landmarks:Lateral, right  Drawer Test:negative  Effusion:absent bilaterally  Crepitus:absent bilaterally  ROM:  Right limited by pain      Neurological:     Sensory:normal to light touch bilateral upper and lower extremities   Positive bilateral tinel sign median nerve. Motor:              Right Bicep5/5           Left Bicep5/5              Right Triceps5/5       Left Triceps5/5          Right Deltoid5/5     Left Deltoid5/5                  Right Quadriceps5/5          Left Quadriceps5/5           Right Gastrocnemius5/5    Left Gastrocnemius5/5  Right Ant Tibialis5/5  Left Ant Tibialis5/5  Reflexes:    Right Brachioradialis reflex2+  Left Brachioradialis reflex2+  Right Biceps reflex2+  Left Biceps reflex2+  Right Triceps reflex2+  Left Triceps reflex2+  Right Quadriceps reflex2+  Left Quadriceps reflex2+  Right Achilles reflex2+  Left Achilles reflex2+  Gait:normal     Dermatology:     Skin:no unusual rashes and no skin lesions     Impression:  Neck and low back pain with radiation to the Right lower extremity   Lumbar spine MRI multilevel degenerative disc disease with facet arthropathy  Cervical spine CT C5-6 degenerative disc disease  Patient had recently moved to [place] from [place], she was seeing pain management there.    She had physical therapy and was on Norco 10/325 QID/x 5   Plan:  Follow up on her neck/low back and right knee pain with complaints of increased bilateral hand numbness and pain. On exam patient has positive bilateral tinel sign (median nerve) bilaterally. Discussed carpal tunnel syndrome and treatment options. Will start patient on Gabapentin 300 mg QHS, encouraged patient to use wrist brace. Will consider carpal tunnel injection and EMG if her pain is not improving. Patient is s/p right knee injection with moderate relief, discussed synvisc injection. Patient mentioned that she will think about it. Will consider diagnostic lumbar facet medial branch block, will revisit after she is done with physical therapy. Continue with OTC pain medications. OARRS report reviewed  Patient encouraged to stay active and to lose weight. Patient is currently in physical therapy. Treatment plan discussed with the patient including medications and procedure side effects     We discussed with the patient that combining opioids, benzodiazepines, alcohol, illicit drugs or sleep aids increases the risk of respiratory depression including death. We discussed that these medications may cause drowsiness, sedation or dizziness and have counseled the patient not to drive or operate machinery. We have discussed that these medications will be prescribed only by one provider. We have discussed with the patient about age related risk factors and have thoroughly discussed the importance of taking these medications as prescribed. The patient verbalizes understanding. ccreferring leeann Hollis M.D.    2020    Patient: Sahra Interiano  :  1960  Age:  61 y.o. Sex:  female     PRE-OPERATIVE DIAGNOSIS: Right  Knee pain, osteoarthitis. POST-OPERATIVE DIAGNOSIS: Same. PROCEDURE PERFORMED:  Right knee injection. SURGEON:   SARATH Hollis M.D. ANESTHESIA: Local    ESTIMATED BLOOD LOSS: None.     BRIEF HISTORY: Sahra Interiano comes in today for Right  knee injection. . After discussing the potential risks and benefits of the procedure with the patient. Lissa Vasques did request that we proceed. A complete History & Physical was reviewed and it is unchanged. DESCRIPTION OF PROCEDURE:   The patient was placed in a seated position. The area of the Right knee was prepped with chloraprep and draped in a sterile manner. The overlying skin and subcutaneous tissues were anesthetized with 0.5% Lidocaine. Then the targeted area of the patellar tendon was palpated and marked. A 25 gauge 1 1/2 inch needle was advanced into the joint capsule. Confirmation of needle placement was obtained by direct visualization of synovial fluid by aspiration. A solution of 0.25 % marcaine 3 cc and 80 mg DepoMedrol was injected easily without complications. The needle was then removed and Band-Aid applied. Disposition the patient tolerated the procedure well and there were no complications . Lissa Vasques will follow up in our [facility] as scheduled. She was encouraged to call with questions, concerns or if worsening of symptoms occurs.     Vin Mark MD

## 2020-12-08 ENCOUNTER — VIRTUAL VISIT (OUTPATIENT)
Dept: FAMILY MEDICINE CLINIC | Age: 60
End: 2020-12-08
Payer: COMMERCIAL

## 2020-12-08 PROCEDURE — G8417 CALC BMI ABV UP PARAM F/U: HCPCS | Performed by: FAMILY MEDICINE

## 2020-12-08 PROCEDURE — G8484 FLU IMMUNIZE NO ADMIN: HCPCS | Performed by: FAMILY MEDICINE

## 2020-12-08 PROCEDURE — 99213 OFFICE O/P EST LOW 20 MIN: CPT | Performed by: FAMILY MEDICINE

## 2020-12-08 PROCEDURE — 4004F PT TOBACCO SCREEN RCVD TLK: CPT | Performed by: FAMILY MEDICINE

## 2020-12-08 PROCEDURE — G8427 DOCREV CUR MEDS BY ELIG CLIN: HCPCS | Performed by: FAMILY MEDICINE

## 2020-12-08 PROCEDURE — 3017F COLORECTAL CA SCREEN DOC REV: CPT | Performed by: FAMILY MEDICINE

## 2020-12-08 RX ORDER — CEFDINIR 300 MG/1
300 CAPSULE ORAL 2 TIMES DAILY
Qty: 20 CAPSULE | Refills: 0 | Status: SHIPPED | OUTPATIENT
Start: 2020-12-08 | End: 2020-12-18

## 2020-12-08 NOTE — PROGRESS NOTES
TeleMedicine Video Visit    This visit was performed as a virtual video visit using a synchronous, two-way, audio-video telehealth technology platform. Patient identification was verified at the start of the visit, including the patient's telephone number and physical location. I discussed with the patient the nature of our telehealth visits, that:     1. Due to the nature of an audio- video modality, the only components of a physical exam that could be done are the elements supported by direct observation. 2. I would evaluate the patient and recommend diagnostics and treatments based on my assessment. 3. If it was felt that the patient should be evaluated in clinic or an emergency room setting, then they would be directed there. 4. Our sessions are not being recorded and that personal health information is protected. 5. Our team would provide follow up care in person if/when the patient needs it. Patient does agree to proceed with telemedicine consultation. Patient's location: home address in [facility]  Physician  location other address in [facility] other people involved in call none. Time spent: Greater than Not billed by time    This visit was completed virtually using Doxy. me      [facility]        SUBJECTIVE:  CC: had concerns including Ear Fullness (C/O sinus pressure; bilateral ear fullness, lightheaded. Says she has a history of Meniere's Disease. Denies Fever, sore throat, sob, loss taste/smell) and Other (patient consents to telemedicine visit and is at home in [place]). Beth Kelly presented to the clinic for a routine visit. Jerry Urbina is a 59-year-old female presenting for a virtual visit today with concerns of ear fullness along with some lightheadedness and sinus pressure. She reports a history of Ménière's disease. Typically with her Ménière's is just her left ear and her tinnitus gets louder. Ménière's is diagnosed in 2011 and notes usually being treated with meclizine, but is not working. The meclizine has not helped today. She denies any dizziness. She denies any fevers, chills, shortness of breath, chest pain, cough, loss of taste are smell. Review of Systems   Constitutional: Negative for appetite change, fatigue and fever. HENT: Positive for congestion, ear pain, sinus pressure and tinnitus. Respiratory: Negative for cough, shortness of breath and wheezing. Cardiovascular: Negative for chest pain and palpitations. Gastrointestinal: Negative for abdominal pain, constipation, diarrhea, nausea and vomiting. Outpatient Medications Marked as Taking for the 12/8/20 encounter (Virtual Visit) with Deni Madrid MD   Medication Sig Dispense Refill    cefdinir (OMNICEF) 300 MG capsule Take 1 capsule by mouth 2 times daily for 10 days 20 capsule 0    busPIRone (BUSPAR) 5 MG tablet Take 1 tablet by mouth 2 times daily as needed (anxiety) 60 tablet 1    fluticasone (FLONASE) 50 MCG/ACT nasal spray SHAKE LIQUID AND USE 1 SPRAY IN EACH NOSTRIL DAILY 16 g 1    albuterol sulfate  (90 Base) MCG/ACT inhaler INHALE 2 PUFFS INTO THE LUNGS EVERY 6 HOURS AS NEEDED FOR WHEEZING OR SHORTNESS OF BREATH 18 g 3    levothyroxine (SYNTHROID) 50 MCG tablet Take 1 tablet by mouth daily 90 tablet 1    calcium carbonate (OSCAL) 500 MG TABS tablet Take 1,000 mg by mouth daily      ferrous sulfate 325 (65 Fe) MG tablet Take 325 mg by mouth daily (with breakfast)      Multiple Vitamin (MULTIVITAMIN PO) Take 1 tablet by mouth 2 times daily.  Ultra Rodrick I have reviewed all pertinent PMHx, PSHx, FamHx, SocialHx, medications, and allergies and updated history as appropriate. OBJECTIVE    VS: LMP  (LMP Unknown)   Physical Exam  Constitutional:       General: She is not in acute distress. Appearance: Normal appearance. She is not ill-appearing. Neurological:      Mental Status: She is oriented to person, place, and time. Psychiatric:         Mood and Affect: Mood normal.         Behavior: Behavior normal.         ASSESSMENT/PLAN:  1. Meniere's disease of left ear    2. Acute sinusitis, recurrence not specified, unspecified location  Quarantine per CDC recommendations. Patient notes with Ménière's sometimes gets an ear infection. She was subcu air at this time. Covid test recommended. Vitamin D, vitamin C, zinc, and aspirin regimen recommended. - cefdinir (OMNICEF) 300 MG capsule; Take 1 capsule by mouth 2 times daily for 10 days  Dispense: 20 capsule; Refill: 0  - COVID-19 Ambulatory; Future      I have reviewed my findings and recommendations with Timothy Cabrera MD  12/17/2020 1:10 PM     Counseled regarding above diagnosis, including possible risks and complications, especially if left uncontrolled. Patient counseled on red flag symptoms and if they occur to go to the ED. Discussed medications risk/benefits and possible side effects and alternatives to treatment. Patient and/or guardian verbalizes understanding, agrees, feels comfortable with and wishes to proceed with above treatment plan. Advised patient regarding importance of keeping up with recommended health maintenance and to schedule as soon as possible if overdue, as this is important in assessing for undiagnosed pathology, especially cancer, as well as protecting against potentially harmful/life threatening disease. Patient and/or guardian verbalizes understanding and agrees with above counseling, assessment and plan. All questions answered.

## 2020-12-17 ASSESSMENT — ENCOUNTER SYMPTOMS
WHEEZING: 0
DIARRHEA: 0
CONSTIPATION: 0
ABDOMINAL PAIN: 0
SHORTNESS OF BREATH: 0
SINUS PRESSURE: 1
COUGH: 0
VOMITING: 0
NAUSEA: 0

## 2020-12-20 RX ORDER — FLUTICASONE PROPIONATE 50 MCG
SPRAY, SUSPENSION (ML) NASAL
Qty: 16 G | Refills: 1 | Status: SHIPPED
Start: 2020-12-20 | End: 2021-02-16

## 2021-01-08 ENCOUNTER — VIRTUAL VISIT (OUTPATIENT)
Dept: PAIN MANAGEMENT | Age: 61
End: 2021-01-08
Payer: COMMERCIAL

## 2021-01-08 DIAGNOSIS — M47.812 CERVICAL FACET JOINT SYNDROME: ICD-10-CM

## 2021-01-08 DIAGNOSIS — F11.20 UNCOMPLICATED OPIOID DEPENDENCE (HCC): ICD-10-CM

## 2021-01-08 DIAGNOSIS — M25.561 CHRONIC PAIN OF RIGHT KNEE: ICD-10-CM

## 2021-01-08 DIAGNOSIS — G89.29 CHRONIC PAIN OF RIGHT KNEE: ICD-10-CM

## 2021-01-08 DIAGNOSIS — G56.03 CARPAL TUNNEL SYNDROME ON BOTH SIDES: ICD-10-CM

## 2021-01-08 DIAGNOSIS — M47.816 LUMBAR FACET ARTHROPATHY: ICD-10-CM

## 2021-01-08 DIAGNOSIS — M51.9 LUMBAR DISC DISORDER: ICD-10-CM

## 2021-01-08 DIAGNOSIS — G89.4 CHRONIC PAIN SYNDROME: Primary | ICD-10-CM

## 2021-01-08 DIAGNOSIS — E66.9 OBESITY (BMI 30-39.9): ICD-10-CM

## 2021-01-08 DIAGNOSIS — M47.816 LUMBAR SPONDYLOSIS: ICD-10-CM

## 2021-01-08 DIAGNOSIS — M47.812 CERVICAL SPONDYLOSIS: ICD-10-CM

## 2021-01-08 DIAGNOSIS — M54.16 LUMBAR RADICULOPATHY: ICD-10-CM

## 2021-01-08 PROCEDURE — 3017F COLORECTAL CA SCREEN DOC REV: CPT | Performed by: PAIN MEDICINE

## 2021-01-08 PROCEDURE — G8427 DOCREV CUR MEDS BY ELIG CLIN: HCPCS | Performed by: PAIN MEDICINE

## 2021-01-08 PROCEDURE — 99213 OFFICE O/P EST LOW 20 MIN: CPT | Performed by: PAIN MEDICINE

## 2021-01-08 RX ORDER — MELOXICAM 7.5 MG/1
7.5 TABLET ORAL DAILY
Qty: 30 TABLET | Refills: 0 | Status: SHIPPED
Start: 2021-01-08 | End: 2021-01-26 | Stop reason: SINTOL

## 2021-01-08 NOTE — PROGRESS NOTES
[facility], [address] [place] [address] Tomás  [phone]    Tele health follow up Note      Norberto Horn     Date of Visit:  1/8/2021    CC:  Tele health follow up   Chief Complaint   Patient presents with    Back Pain     pain level 8    Neck Pain    Knee Pain     right    Hand Pain     right hand       Consent:  Telehealth Visit due to [place] 19 pandemic  The patient and/or health care decision maker is aware that he/she may receive a bill for this tele-health service Doxy Me, depending on his insurance coverage, and has provided verbal consent to proceed: Yes  I have considered the risks of abuse, dependence, addiction and diversion. My patient is aware that they will need a follow-up visit (in-person or virtually) at the appropriate time indicated for continued medications. Further, my patient is aware that when this acute crisis has lifted, they will be expected to return for an in-person visit and all elements of standard local and hospital guidelines in order to continue this medication. Patient Location:Home   Physician Location: Other address in [place]    HPI:  Follow up on her neck/low back/right knee and bilateral hand pain. Appropriate analgesia with current medications regimen: No  Change in quality of symptoms:none  Medication side effects:yes Gabapentin Drossiness. Recent diagnostic testing:none. Recent interventional procedures:none     She has not been on anticoagulation medications to include none. The patient  has not been on herbal supplements. The patient is not diabetic.     Imaging:   Lumbar spine MRI 05/2017  1.  Degenerative changes, most significant disc herniation is at   L2-L3.      CT cervical spine 2016  1.  No evidence for acute fracture or dislocation of cervical spine.       2. Mild reversal of normal cervical lordosis with degenerative changes   at C5-C6 level      Right knee Xray 2020:  Mild degenerative changes.    Previous treatments: Physical Therapy and medications. Potential Aberrant Drug-Related Behavior:    No     Urine Drug Screening:  None     OARRS report:  01/2020 consistent. Past Medical History: Reviewed    Past Surgical History: Reviewed     Home Medications: Reviewed    Allergies: Reviewed     Social History: Reviewed     REVIEW OF SYSTEMS:     Nancy Gomez denies fever/chills, chest pain, shortness of breath, new bowel or bladder complaints. All other review of systems was negative. PHYSICAL EXAMINATION:      General:       A & O x3    HEENT:    Head:normocephalic and atraumatic    Lungs:    Breathing:Breathing Pattern: regular, no distress    Cervical spine:    Inspection:normal  Range of motion:not done    Lumbar spine:    Range of motion:not done. Musculoskeletal:    SLR:not done right, not done left, sitting     Extremities:    Tremors:None bilaterally upper and lower  Intact:Yes    Neurological:     Motor:          No apparent weakness per patient         Impression:    Neck and low back pain with radiation to the Right lower extremity   Lumbar spine MRI multilevel degenerative disc disease with facet arthropathy  Cervical spine CT C5-6 degenerative disc disease  Patient had recently moved to [place] from [place], she was seeing pain management there. She had physical therapy and was on Norco 10/325 QID/x 5   Plan:  Follow up on her neck/low back/right knee and bilateral jamey pain. Discontinue Gabapentin (side effects)  Discussed ordering EMG and possible surgical evaluation, will hold off because patient is going out of town. Patient mentioned that she wants to proceed with Synvisc injections when she comes back, will schedule. Patient had finished physical therapy with less than expected response, will revisit lumbar facet MBB at her next visit. Will start patient on Mobic 7.5 mg QD  OARRS report reviewed 01/2021.

## 2021-01-12 ENCOUNTER — TELEPHONE (OUTPATIENT)
Dept: PAIN MANAGEMENT | Age: 61
End: 2021-01-12

## 2021-01-12 RX ORDER — METHOCARBAMOL 750 MG/1
750 TABLET, FILM COATED ORAL 4 TIMES DAILY
Qty: 60 TABLET | Refills: 0 | Status: SHIPPED | OUTPATIENT
Start: 2021-01-12 | End: 2021-01-27

## 2021-01-26 ENCOUNTER — TELEPHONE (OUTPATIENT)
Dept: PAIN MANAGEMENT | Age: 61
End: 2021-01-26

## 2021-01-26 RX ORDER — IBUPROFEN 800 MG/1
800 TABLET ORAL 3 TIMES DAILY PRN
Qty: 90 TABLET | Refills: 5 | Status: SHIPPED
Start: 2021-01-26 | End: 2021-03-22

## 2021-02-16 ENCOUNTER — OFFICE VISIT (OUTPATIENT)
Dept: PAIN MANAGEMENT | Age: 61
End: 2021-02-16
Payer: COMMERCIAL

## 2021-02-16 VITALS
RESPIRATION RATE: 18 BRPM | WEIGHT: 185 LBS | BODY MASS INDEX: 34.04 KG/M2 | HEIGHT: 62 IN | HEART RATE: 90 BPM | OXYGEN SATURATION: 97 % | SYSTOLIC BLOOD PRESSURE: 140 MMHG | DIASTOLIC BLOOD PRESSURE: 82 MMHG | TEMPERATURE: 97.3 F

## 2021-02-16 DIAGNOSIS — M47.816 LUMBAR FACET ARTHROPATHY: ICD-10-CM

## 2021-02-16 DIAGNOSIS — M19.011 PRIMARY OSTEOARTHRITIS OF RIGHT SHOULDER: ICD-10-CM

## 2021-02-16 DIAGNOSIS — M51.9 LUMBAR DISC DISORDER: ICD-10-CM

## 2021-02-16 DIAGNOSIS — M47.812 CERVICAL SPONDYLOSIS: ICD-10-CM

## 2021-02-16 DIAGNOSIS — M47.816 LUMBAR SPONDYLOSIS: ICD-10-CM

## 2021-02-16 DIAGNOSIS — G89.4 CHRONIC PAIN SYNDROME: Primary | ICD-10-CM

## 2021-02-16 DIAGNOSIS — M47.812 CERVICAL FACET JOINT SYNDROME: ICD-10-CM

## 2021-02-16 DIAGNOSIS — G89.29 CHRONIC PAIN OF RIGHT KNEE: ICD-10-CM

## 2021-02-16 DIAGNOSIS — J30.2 SEASONAL ALLERGIC RHINITIS: ICD-10-CM

## 2021-02-16 DIAGNOSIS — M25.561 CHRONIC PAIN OF RIGHT KNEE: ICD-10-CM

## 2021-02-16 PROCEDURE — G8427 DOCREV CUR MEDS BY ELIG CLIN: HCPCS | Performed by: PAIN MEDICINE

## 2021-02-16 PROCEDURE — 99214 OFFICE O/P EST MOD 30 MIN: CPT | Performed by: PAIN MEDICINE

## 2021-02-16 PROCEDURE — G8417 CALC BMI ABV UP PARAM F/U: HCPCS | Performed by: PAIN MEDICINE

## 2021-02-16 PROCEDURE — 6360000002 HC RX W HCPCS

## 2021-02-16 PROCEDURE — 3017F COLORECTAL CA SCREEN DOC REV: CPT | Performed by: PAIN MEDICINE

## 2021-02-16 PROCEDURE — 99214 OFFICE O/P EST MOD 30 MIN: CPT

## 2021-02-16 PROCEDURE — 4004F PT TOBACCO SCREEN RCVD TLK: CPT | Performed by: PAIN MEDICINE

## 2021-02-16 PROCEDURE — G8484 FLU IMMUNIZE NO ADMIN: HCPCS | Performed by: PAIN MEDICINE

## 2021-02-16 RX ORDER — BUPIVACAINE HYDROCHLORIDE 2.5 MG/ML
3 INJECTION, SOLUTION EPIDURAL; INFILTRATION; INTRACAUDAL ONCE
Status: COMPLETED | OUTPATIENT
Start: 2021-02-16 | End: 2021-02-16

## 2021-02-16 RX ORDER — COVID-19 ANTIGEN TEST
1 KIT MISCELLANEOUS 2 TIMES DAILY PRN
COMMUNITY
End: 2021-04-13 | Stop reason: ALTCHOICE

## 2021-02-16 RX ORDER — FLUTICASONE PROPIONATE 50 MCG
SPRAY, SUSPENSION (ML) NASAL
Qty: 16 G | Refills: 2 | Status: SHIPPED
Start: 2021-02-16 | End: 2021-03-22

## 2021-02-16 RX ORDER — METHYLPREDNISOLONE ACETATE 40 MG/ML
40 INJECTION, SUSPENSION INTRA-ARTICULAR; INTRALESIONAL; INTRAMUSCULAR; SOFT TISSUE ONCE
Status: COMPLETED | OUTPATIENT
Start: 2021-02-16 | End: 2021-02-16

## 2021-02-16 RX ADMIN — BUPIVACAINE HYDROCHLORIDE 25 MG: 2.5 INJECTION, SOLUTION EPIDURAL; INFILTRATION; INTRACAUDAL at 11:15

## 2021-02-16 RX ADMIN — METHYLPREDNISOLONE ACETATE 40 MG: 40 INJECTION, SUSPENSION INTRA-ARTICULAR; INTRALESIONAL; INTRAMUSCULAR; SOFT TISSUE at 11:14

## 2021-02-16 NOTE — PROGRESS NOTES
Do you currently have any of the following:    Fever: No  Headache:  No  Cough: No  Shortness of breath: No  Exposed to anyone with these symptoms: No                                                                                                                Seema Perez presents to the [address] [id number] on 2/16/2021. Shelia Robledo is complaining of pain right new pain, right shoulder pain about 10 days ago with minimal mvmt, low back pain. . The pain is constant. The pain is described as aching and dull. Pain is rated on her best day at a 7, on her worst day at a 10, and on average at a 9 on the VAS scale. She took her last dose of ibruprofen 800mg last night at 2100. Shelia Robledo does not have issues with constipation. Any procedures since your last visit: No    She is  on NSAIDS and  is not on anticoagulation medications to include none . Pacemaker or defibrillator: no.    Medication Contract and Consent for Opioid Use Documents Filed      No documents found                   BP (!) 140/82   Pulse 90   Temp 97.3 °F (36.3 °C)   Resp 18   Ht 5' 2\" (1.575 m)   Wt 185 lb (83.9 kg)   LMP  (LMP Unknown)   SpO2 97%   BMI 33.84 kg/m²      No LMP recorded (lmp unknown).  Patient is postmenopausal.

## 2021-02-16 NOTE — PROGRESS NOTES
[address] [facility], [address] [place] [address], [facility] Tomás  [phone]    Follow up Note      Cordelia Gutierrez     Date of Visit:  2021    CC:  Patient presents for follow up   Chief Complaint   Patient presents with    Follow-up     4week;      HPI:  Follow up on her neck/low back/right knee and bilateral hand pain with complaints of increased right shoulder pain. Appropriate analgesia with current medications regimen: No  Change in quality of symptoms:none  Medication side effects:No  Recent diagnostic testing:none.   Recent interventional procedures:none     She has not been on anticoagulation medications to include none. The patient Jimena Gibbs not been on herbal supplements.  The patient is not diabetic.     Imaging:   Lumbar spine MRI 2017  1.  Degenerative changes, most significant disc herniation is at   L2-L3.      CT cervical spine   1. No evidence for acute fracture or dislocation of cervical spine.       2. Mild reversal of normal cervical lordosis with degenerative changes   at C5-C6 level      Right knee Xray :  Mild degenerative changes.     Previous treatments: Physical Therapy and medications.                                        Potential Aberrant Drug-Related Behavior:    No     Urine Drug Screening:  None     OARRS report:  2021 consistent. Past Medical History:   Diagnosis Date    Anxiety     Chronic back pain     Hypothyroidism     Meniere's disease     Osteoarthritis     Unspecified sleep apnea      Past Surgical History:   Procedure Laterality Date     SECTION      CHOLECYSTECTOMY  2012 - Dr. Jason Felder, [facility], Laparoscopic     RIGHT COLECTOMY  2014    Viktoriya Reddy - laparoscopic right colectomy     TAMMIE-EN-Y GASTRIC BYPASS  2010 - Laparoscopic    TUBAL LIGATION      UPPER GASTROINTESTINAL ENDOSCOPY       Prior to Admission medications    Medication Sig Start Date End Date Taking?  Authorizing Provider   fluticasone Stress: Not on file   Relationships    Social connections     Talks on phone: Not on file     Gets together: Not on file     Attends Gnosticism service: Not on file     Active member of club or organization: Not on file     Attends meetings of clubs or organizations: Not on file     Relationship status: Not on file    Intimate partner violence     Fear of current or ex partner: Not on file     Emotionally abused: Not on file     Physically abused: Not on file     Forced sexual activity: Not on file   Other Topics Concern    Not on file   Social History Narrative    Not on file     Family History   Problem Relation Age of Onset    High Blood Pressure Mother     Heart Disease Mother     Obesity Mother     High Blood Pressure Father     High Blood Pressure Brother     Cancer Brother     Obesity Brother      REVIEW OF SYSTEMS:     Rell Jimenez denies fever/chills, chest pain, shortness of breath, new bowel or bladder complaints. All other review of systems was negative. PHYSICAL EXAMINATION:      BP (!) 140/82   Pulse 90   Temp 97.3 °F (36.3 °C)   Resp 18   Ht 5' 2\" (1.575 m)   Wt 185 lb (83.9 kg)   LMP  (LMP Unknown)   SpO2 97%   BMI 33.84 kg/m²     General:       General appearance:   pleasant and well-hydrated. , in mild to moderate discomfort and A & O x3  Build:Overweight     HEENT:     Head:normocephalic and atraumatic  Sclera: icterus absent,      Lungs:     Breathing:Breathing Pattern: regular, no distress     Abdomen:     Shape:non-distended and normal  Tenderness:none     Cervical spine:     Inspection:normal  Palpation:tenderness paravertebral muscles, facet loading, left, positive and tenderness. Range of motion:abnormal mildly flexion, extension rotation bilateral and is  painful.     Lumbar spine:     Spine inspection:normal   CVA tenderness:No   Palpation:tenderness paravertebral muscles, facet loading, right, positive and tenderness.   Range of motion:abnormal moderately Lateral bending, flexion, extension rotation right and is  painful.     Musculoskeletal:     Trigger points in Paraveteral:absent bilaterally  SI joint tenderness:positive right, positive left  SLR:negative right, negative left, sitting      Extremities:     Tremors:None bilaterally upper and lower  Range of motion:Limited extension and abduction of right shoulder, pain with internal rotation of hips negative. Intact:Yes  Edema:Normal    Knee: Inspection:symmetric, swelling none bilaterally  Tenderness of Bony Landmarks:Lateral, right  Drawer Test:negative  Effusion:absent bilaterally  Crepitus:absent bilaterally  ROM:  Right limited by pain      Neurological:     Sensory:normal to light touch bilateral upper and lower extremities   Positive bilateral tinel sign median nerve. Motor:              Right Bicep5/5           Left Bicep5/5              Right Triceps5/5       Left Triceps5/5          Right Deltoid5/5     Left Deltoid5/5                  Right Quadriceps5/5          Left Quadriceps5/5           Right Gastrocnemius5/5    Left Gastrocnemius5/5  Right Ant Tibialis5/5  Left Ant Tibialis5/5  Reflexes:    Right Brachioradialis reflex2+  Left Brachioradialis reflex2+  Right Biceps reflex2+  Left Biceps reflex2+  Right Triceps reflex2+  Left Triceps reflex2+  Right Quadriceps reflex2+  Left Quadriceps reflex2+  Right Achilles reflex2+  Left Achilles reflex2+  Gait:normal     Dermatology:     Skin:no unusual rashes and no skin lesions     Impression:  Neck and low back pain with radiation to the Right lower extremity   Lumbar spine MRI multilevel degenerative disc disease with facet arthropathy  Cervical spine CT C5-6 degenerative disc disease  Patient had recently moved to [place] from [place], she was seeing pain management there. She had physical therapy and was on Norco 10/325 QID/x 5   Discussed ordering EMG and possible surgical evaluation, patient wants to hold off.   Plan:  Follow up on her neck/low back/right knee and bilateral hands pain with complaints of increased right shoulder pain. Continues to benefit from right knee injection, will consider synvisc injection if needed later. Will schedule patient for right shoulder Xray and injection. Continue with Mobic 7.5 mg QD  OARRS report reviewed 2021. Patient encouraged to stay active and to lose weight, will refer patient to physical therapy. Treatment plan discussed with the patient including medications and procedure side effects. We discussed with the patient that combining opioids, benzodiazepines, alcohol, illicit drugs or sleep aids increases the risk of respiratory depression including death. We discussed that these medications may cause drowsiness, sedation or dizziness and have counseled the patient not to drive or operate machinery. We have discussed that these medications will be prescribed only by one provider. We have discussed with the patient about age related risk factors and have thoroughly discussed the importance of taking these medications as prescribed. The patient verbalizes understanding. jeovany Curtis M.D.    2021    Patient: Rosendo Martinez  :  1960  Age:  61 y.o. Sex:  female     PRE-OPERATIVE DIAGNOSIS: Right   Shoulder pain, osteoarthritis. POST-OPERATIVE DIAGNOSIS: Same. PROCEDURE PERFORMED: Right  Shoulder steroid injection. SURGEON:   SARATH Curtis M.D. ANESTHESIA: Local    ESTIMATED BLOOD LOSS: None. BRIEF HISTORY: Rosendo Martinez comes in today for Right  Shoulder steroid injection. After discussing the potential risks and benefits of the procedure with the patient. Bo Hargrove did request that we proceed. A complete History & Physical was reviewed and it is unchanged. DESCRIPTION OF PROCEDURE:   The patient was placed in a seated position. The area of the Right  shoulder was prepped with chloraprep and draped in a sterile manner.  The overlying skin and subcutaneous tissues were anesthetized with 0.5% Lidocaine. Using the posterior approach, a 25 gauge 1 1/2 inch  needle was advanced  In anterolateral projection into the joint capsule. After negative aspiration a solution of 0.25 % marcaine 3 cc and 40 mg DepoMedrol was injected easily without complications. The needle was then removed and Band-Aid applied. Disposition the patient tolerated the procedure well and there were no complications . Kimber Chavez will follow up in our [facility] as scheduled. She was encouraged to call with questions, concerns or if worsening of symptoms occurs.     Cr Jaeger MD

## 2021-02-17 ENCOUNTER — TELEPHONE (OUTPATIENT)
Dept: PAIN MANAGEMENT | Age: 61
End: 2021-02-17

## 2021-02-17 NOTE — TELEPHONE ENCOUNTER
Patient phoned in today stating\" she is in severe pain and needs something\". I have to work and the pain is severe. She did have the xray done at [facility].

## 2021-03-04 DIAGNOSIS — E03.9 ACQUIRED HYPOTHYROIDISM: ICD-10-CM

## 2021-03-04 RX ORDER — LEVOTHYROXINE SODIUM 0.05 MG/1
50 TABLET ORAL DAILY
Qty: 90 TABLET | Refills: 0 | Status: SHIPPED
Start: 2021-03-04 | End: 2021-05-27

## 2021-03-22 ENCOUNTER — TELEPHONE (OUTPATIENT)
Dept: PHYSICAL THERAPY | Age: 61
End: 2021-03-22

## 2021-03-22 ENCOUNTER — TELEPHONE (OUTPATIENT)
Dept: PAIN MANAGEMENT | Age: 61
End: 2021-03-22

## 2021-03-22 DIAGNOSIS — S49.91XA INJURY, SHOULDER AND UPPER ARM, RIGHT, INITIAL ENCOUNTER: ICD-10-CM

## 2021-03-22 DIAGNOSIS — M25.511 ACUTE PAIN OF RIGHT SHOULDER: Primary | ICD-10-CM

## 2021-03-22 RX ORDER — NABUMETONE 750 MG/1
750 TABLET, FILM COATED ORAL 2 TIMES DAILY
Qty: 60 TABLET | Refills: 0 | Status: SHIPPED
Start: 2021-03-22 | End: 2021-04-13 | Stop reason: ALTCHOICE

## 2021-03-22 RX ORDER — TIZANIDINE 2 MG/1
2 TABLET ORAL 3 TIMES DAILY PRN
Qty: 45 TABLET | Refills: 1 | Status: SHIPPED | OUTPATIENT
Start: 2021-03-22 | End: 2021-04-06

## 2021-03-22 NOTE — TELEPHONE ENCOUNTER
I called and spoke to patient about acute right shoulder pain with a popping sound after dog jumped on her arm 3 days ago. Pain to right shoulder progressively getting worse and unable to lift arm. Denies numbness and tingling to fingers but coldness noted to finger tips. I will order MRI of right shoulder at this time. I will also order muscle relaxer Tizanidine 2mg since robaxin is not helping. Pt verbalized understanding and no further questions noted. I will also order Relafen for pain. Stomach pain noted with mobic and pt denies problems and not sure what that medication is. Pt denies hx of ulcers or hernia or any gi related disorders. Pt cautioned to take Relafen with food and if any gi upset occurs to discontinue medication immediately. Pt verbalized understanding and medication sent to pharmacy on file.

## 2021-03-22 NOTE — TELEPHONE ENCOUNTER
Date: 3/22/2021       Patient Name: Anand Stafford  : 1960      MRN: [id number]    No show/no call for PT evaluation scheduled at [phone] today.     Abdoul Guillermo, PT

## 2021-03-22 NOTE — TELEPHONE ENCOUNTER
Patient is having problems with right arm. Her daughters dog  Jumped on her and the arm made noise and is hurting. She is asking if she should go the therapy.

## 2021-03-25 ENCOUNTER — TELEPHONE (OUTPATIENT)
Dept: PAIN MANAGEMENT | Age: 61
End: 2021-03-25

## 2021-03-26 ENCOUNTER — TELEPHONE (OUTPATIENT)
Dept: PAIN MANAGEMENT | Age: 61
End: 2021-03-26

## 2021-03-26 RX ORDER — METHYLPREDNISOLONE 4 MG/1
TABLET ORAL
Qty: 1 KIT | Refills: 0 | Status: SHIPPED | OUTPATIENT
Start: 2021-03-26 | End: 2021-04-01

## 2021-03-26 NOTE — TELEPHONE ENCOUNTER
I called and spoke to patient who explained Relafen causes gi upset and Tizanidine with no relief. Pt scheduled for MRI next week. I will also call in Medrol dose pack to help with acute injury. Offered to for patient to come see Dr Tiny Mcgee today but pt unable to come in today. Pt scheduled for office visit next Tuesday with Dr. Tiny Mcgee. No further needs noted at this time. If medrol pack does not help but advised that she may need to go to ER for further evaluation.

## 2021-03-26 NOTE — TELEPHONE ENCOUNTER
Dolores phoned in again stating she is in a lot of pain and needs something. She is scheduled with Dr Espinoza Lancaster on the 30th.

## 2021-03-30 ENCOUNTER — OFFICE VISIT (OUTPATIENT)
Dept: PAIN MANAGEMENT | Age: 61
End: 2021-03-30
Payer: COMMERCIAL

## 2021-03-30 VITALS
OXYGEN SATURATION: 96 % | BODY MASS INDEX: 34.04 KG/M2 | HEIGHT: 62 IN | TEMPERATURE: 97.7 F | HEART RATE: 82 BPM | WEIGHT: 185 LBS | SYSTOLIC BLOOD PRESSURE: 138 MMHG | DIASTOLIC BLOOD PRESSURE: 76 MMHG | RESPIRATION RATE: 16 BRPM

## 2021-03-30 DIAGNOSIS — M47.812 CERVICAL FACET JOINT SYNDROME: ICD-10-CM

## 2021-03-30 DIAGNOSIS — M19.011 PRIMARY OSTEOARTHRITIS OF RIGHT SHOULDER: ICD-10-CM

## 2021-03-30 DIAGNOSIS — G89.4 CHRONIC PAIN SYNDROME: ICD-10-CM

## 2021-03-30 DIAGNOSIS — M51.9 LUMBAR DISC DISORDER: ICD-10-CM

## 2021-03-30 DIAGNOSIS — M47.812 CERVICAL SPONDYLOSIS: ICD-10-CM

## 2021-03-30 DIAGNOSIS — M54.16 LUMBAR RADICULOPATHY: Primary | ICD-10-CM

## 2021-03-30 DIAGNOSIS — G56.03 CARPAL TUNNEL SYNDROME ON BOTH SIDES: ICD-10-CM

## 2021-03-30 DIAGNOSIS — M47.816 LUMBAR SPONDYLOSIS: ICD-10-CM

## 2021-03-30 DIAGNOSIS — G89.29 CHRONIC PAIN OF RIGHT KNEE: ICD-10-CM

## 2021-03-30 DIAGNOSIS — M47.816 LUMBAR FACET ARTHROPATHY: ICD-10-CM

## 2021-03-30 DIAGNOSIS — M25.561 CHRONIC PAIN OF RIGHT KNEE: ICD-10-CM

## 2021-03-30 PROCEDURE — G8484 FLU IMMUNIZE NO ADMIN: HCPCS | Performed by: PAIN MEDICINE

## 2021-03-30 PROCEDURE — 3017F COLORECTAL CA SCREEN DOC REV: CPT | Performed by: PAIN MEDICINE

## 2021-03-30 PROCEDURE — 4004F PT TOBACCO SCREEN RCVD TLK: CPT | Performed by: PAIN MEDICINE

## 2021-03-30 PROCEDURE — G8417 CALC BMI ABV UP PARAM F/U: HCPCS | Performed by: PAIN MEDICINE

## 2021-03-30 PROCEDURE — 99213 OFFICE O/P EST LOW 20 MIN: CPT | Performed by: PAIN MEDICINE

## 2021-03-30 PROCEDURE — 99214 OFFICE O/P EST MOD 30 MIN: CPT | Performed by: PAIN MEDICINE

## 2021-03-30 PROCEDURE — G8427 DOCREV CUR MEDS BY ELIG CLIN: HCPCS | Performed by: PAIN MEDICINE

## 2021-03-30 RX ORDER — FLUTICASONE PROPIONATE 50 MCG
SPRAY, SUSPENSION (ML) NASAL PRN
COMMUNITY
Start: 2021-03-22 | End: 2021-05-17

## 2021-03-30 RX ORDER — TIOTROPIUM BROMIDE INHALATION SPRAY 1.56 UG/1
SPRAY, METERED RESPIRATORY (INHALATION)
COMMUNITY
Start: 2020-12-23 | End: 2021-04-13 | Stop reason: ALTCHOICE

## 2021-03-30 RX ORDER — ACETAMINOPHEN AND CODEINE PHOSPHATE 300; 30 MG/1; MG/1
1 TABLET ORAL DAILY PRN
Qty: 7 TABLET | Refills: 0 | Status: SHIPPED
Start: 2021-03-30 | End: 2021-04-06 | Stop reason: SINTOL

## 2021-03-30 NOTE — PROGRESS NOTES
Do you currently have any of the following:    Fever: No  Headache:  No  Cough: No  Shortness of breath: No  Exposed to anyone with these symptoms: No                                                                                                                Cisco Gutierrez presents to the [facility] on 3/30/2021. Mayra Ibarra is complaining of pain in her shoulder. The pain is constant. The pain is described as aching and throbbing. Pain is rated on her best day at a 8, on her worst day at a 10, and on average at a 9 on the VAS scale. She took her last dose of ibuprofen and aleve . Mayra Ibarra does not have issues with constipation. Any procedures since your last visit: No    She is  on NSAIDS and  is not on anticoagulation medications to include none and is managed by NA. Pacemaker or defibrillator: No Physician managing device is NA. Medication Contract and Consent for Opioid Use Documents Filed      No documents found                   /76   Pulse 82   Temp 97.7 °F (36.5 °C) (Infrared)   Resp 16   Ht 5' 2\" (1.575 m)   Wt 185 lb (83.9 kg)   LMP  (LMP Unknown)   SpO2 96%   BMI 33.84 kg/m²      No LMP recorded (lmp unknown).  Patient is postmenopausal.

## 2021-03-30 NOTE — PROGRESS NOTES
[address]  [address] [facility], [address] [place] [address] Tomás  [phone]    Follow up Note      Anand Stafford     Date of Visit:  3/30/2021    CC:  Patient presents for follow up   No chief complaint on file. HPI:  Follow up on her neck/low back/right knee/bilateral hand/right shoulder pain. Appropriate analgesia with current medications regimen: No  Change in quality of symptoms:none  Medication side effects:No  Recent diagnostic testing:Right shoulder Xray  Recent interventional procedures:Right shoulder injection good     She has not been on anticoagulation medications to include none. The patient Carmen Bee not been on herbal supplements.  The patient is not diabetic.     Imaging:   Right shoulder Xray :  1.  Minimal degenerative changes at the glenohumeral and   acromioclavicular joint    Lumbar spine MRI 2017  1.  Degenerative changes, most significant disc herniation is at   L2-L3.      CT cervical spine   1. No evidence for acute fracture or dislocation of cervical spine.       2. Mild reversal of normal cervical lordosis with degenerative changes   at C5-C6 level      Right knee Xray :  Mild degenerative changes.     Previous treatments: Physical Therapy and medications.                                        Potential Aberrant Drug-Related Behavior:    No     Urine Drug Screening:  None     OARRS report:  2021 consistent.     Past Medical History:   Diagnosis Date    Anxiety     Chronic back pain     Hypothyroidism     Meniere's disease     Osteoarthritis     Unspecified sleep apnea      Past Surgical History:   Procedure Laterality Date     SECTION      CHOLECYSTECTOMY  2012 - [facility]. Delia Hall, [facility], [facility]aroscopic     RIGHT COLECTOMY  2014    Jorje Phelps - laparoscopic right colectomy     TAMMIE-EN-Y GASTRIC BYPASS  2010 - Laparoscopic    TUBAL LIGATION      UPPER GASTROINTESTINAL ENDOSCOPY       Prior to Admission medications Medication Sig Start Date End Date Taking? Authorizing Provider   methylPREDNISolone (MEDROL DOSEPACK) 4 MG tablet Take by mouth as directed 3/26/21 4/1/21  MISAEL Farmer CNP   tiZANidine (ZANAFLEX) 2 MG tablet Take 1 tablet by mouth 3 times daily as needed (spasms) 3/22/21 4/6/21  MISAEL Farmer CNP   nabumetone (RELAFEN) 750 MG tablet Take 1 tablet by mouth 2 times daily 3/22/21 4/21/21  MISAEL Farmer CNP   levothyroxine (SYNTHROID) 50 MCG tablet Take 1 tablet by mouth daily 3/4/21   Roel Luque,    Naproxen Sodium (ALEVE) 220 MG CAPS Take 1 tablet by mouth 2 times daily as needed for Pain    Historical Provider, MD   calcium carbonate (OSCAL) 500 MG TABS tablet Take 1,000 mg by mouth daily    Historical Provider, MD   ferrous sulfate 325 (65 Fe) MG tablet Take 325 mg by mouth daily (with breakfast)    Historical Provider, MD   Multiple Vitamin (MULTIVITAMIN PO) Take 1 tablet by mouth 2 times daily.  Ultra Rodrcik     Historical Provider, MD     Allergies   Allergen Reactions    Meloxicam      Severe stomach pain    Tramadol Other (See Comments)     Makes blisters in mouth     Social History     Socioeconomic History    Marital status:      Spouse name: Not on file    Number of children: Not on file    Years of education: Not on file    Highest education level: Not on file   Occupational History    Not on file   Social Needs    Financial resource strain: Not on file    Food insecurity     Worry: Not on file     Inability: Not on file    Transportation needs     Medical: Not on file     Non-medical: Not on file   Tobacco Use    Smoking status: Current Every Day Smoker     Packs/day: 1.00     Years: 7.00     Pack years: 7.00     Types: Cigarettes     Start date: 1/1/2012    Smokeless tobacco: Never Used    Tobacco comment: Previously smoked for 8 years, then quit for 15 years, then started again   Substance and Sexual Activity    Alcohol use: No    Drug use: Never    Sexual activity: Not Currently   Lifestyle    Physical activity     Days per week: Not on file     Minutes per session: Not on file    Stress: Not on file   Relationships    Social connections     Talks on phone: Not on file     Gets together: Not on file     Attends Sabianism service: Not on file     Active member of club or organization: Not on file     Attends meetings of clubs or organizations: Not on file     Relationship status: Not on file    Intimate partner violence     Fear of current or ex partner: Not on file     Emotionally abused: Not on file     Physically abused: Not on file     Forced sexual activity: Not on file   Other Topics Concern    Not on file   Social History Narrative    Not on file     Family History   Problem Relation Age of Onset    High Blood Pressure Mother     Heart Disease Mother     Obesity Mother     High Blood Pressure Father     High Blood Pressure Brother     Cancer Brother     Obesity Brother      REVIEW OF SYSTEMS:     Sandy Ki denies fever/chills, chest pain, shortness of breath, new bowel or bladder complaints. All other review of systems was negative. PHYSICAL EXAMINATION:      LMP  (LMP Unknown)     General:       General appearance:   pleasant and well-hydrated. , in moderate discomfort and A & O x3  Build:Overweight     HEENT:     Head:normocephalic and atraumatic  Sclera: icterus absent,      Lungs:     Breathing:Breathing Pattern: regular, no distress     Abdomen:     Shape:non-distended and normal  Tenderness:none     Cervical spine:     Inspection:normal  Palpation:tenderness paravertebral muscles, facet loading, left, positive and tenderness. Range of motion:abnormal mildly flexion, extension rotation bilateral and is  painful.     Lumbar spine:     Spine inspection:normal   CVA tenderness:No   Palpation:tenderness paravertebral muscles, facet loading, right, positive and tenderness.   Range of motion:abnormal moderately Lateral bending, flexion, extension rotation right and is  painful.     Musculoskeletal:     Trigger points in Paraveteral:absent bilaterally  SI joint tenderness:positive right, positive left  SLR:negative right, negative left, sitting      Extremities:     Tremors:None bilaterally upper and lower  Range of motion:Limited extension and abduction of right shoulder, pain with internal rotation of hips negative. Intact:Yes  Edema:Normal     Neurological:     Sensory:normal to light touch bilateral upper and lower extremities   Positive bilateral tinel sign median nerve. Motor:              Right Bicep5/5           Left Bicep5/5              Right Triceps5/5       Left Triceps5/5          Right Deltoid5/5     Left Deltoid5/5                  Reflexes:    Right Brachioradialis reflex2+  Left Brachioradialis reflex2+  Right Biceps reflex2+  Left Biceps reflex2+  Right Triceps reflex2+  Left Triceps reflex2+  Gait:normal     Dermatology:     Skin:no unusual rashes and no skin lesions     Impression:  Neck and low back pain with radiation to the Right lower extremity   Lumbar spine MRI multilevel degenerative disc disease with facet arthropathy  Cervical spine CT C5-6 degenerative disc disease  Patient had recently moved to [place] from [place], she was seeing pain management there. She had physical therapy and was on Norco 10/325 QID/x 5   Discussed ordering EMG and possible surgical evaluation, patient wants to hold off. Continues to benefit from right knee injection, will consider synvisc injection if needed later. Plan:  Follow up on her neck/low back/right knee/bilateral hands pain and right shoulder pain. Patient is s/p right shoulder injection with good outcome until her dog jumped on her. Results of right shoulder Xray were discussed with the patient. Patient had called because of increased pain and was started on Medrol dose raffaele/muscle relaxants/NSAIDs  Patient is scheduled for right shoulder MRI 04/01.   Will start patient on Tylenol # 3 QD for one week. Continue with Mobic 7.5 mg QD  OARRS report reviewed 03/2021. Patient encouraged to stay active and to lose weight, hold off on physical therapy for now. Treatment plan discussed with the patient including medications  procedure side effects. We discussed with the patient that combining opioids, benzodiazepines, alcohol, illicit drugs or sleep aids increases the risk of respiratory depression including death. We discussed that these medications may cause drowsiness, sedation or dizziness and have counseled the patient not to drive or operate machinery. We have discussed that these medications will be prescribed only by one provider. We have discussed with the patient about age related risk factors and have thoroughly discussed the importance of taking these medications as prescribed. The patient verbalizes understanding. jeovany Babin M.D.

## 2021-04-01 ENCOUNTER — HOSPITAL ENCOUNTER (OUTPATIENT)
Dept: MRI IMAGING | Age: 61
Discharge: HOME | End: 2021-04-03
Payer: COMMERCIAL

## 2021-04-01 DIAGNOSIS — S49.91XA INJURY, SHOULDER AND UPPER ARM, RIGHT, INITIAL ENCOUNTER: ICD-10-CM

## 2021-04-01 DIAGNOSIS — M25.511 ACUTE PAIN OF RIGHT SHOULDER: ICD-10-CM

## 2021-04-01 PROCEDURE — 73221 MRI JOINT UPR EXTREM W/O DYE: CPT

## 2021-04-06 ENCOUNTER — TELEPHONE (OUTPATIENT)
Dept: PAIN MANAGEMENT | Age: 61
End: 2021-04-06

## 2021-04-06 DIAGNOSIS — S46.011A TRAUMATIC COMPLETE TEAR OF RIGHT ROTATOR CUFF, INITIAL ENCOUNTER: ICD-10-CM

## 2021-04-06 DIAGNOSIS — S73.191A ACETABULAR LABRUM TEAR, RIGHT, INITIAL ENCOUNTER: ICD-10-CM

## 2021-04-06 DIAGNOSIS — S49.91XA INJURY, SHOULDER AND UPPER ARM, RIGHT, INITIAL ENCOUNTER: Primary | ICD-10-CM

## 2021-04-06 DIAGNOSIS — M19.011 PRIMARY OSTEOARTHRITIS OF RIGHT SHOULDER: ICD-10-CM

## 2021-04-06 DIAGNOSIS — S46.811A TRAUMATIC TEAR OF SUPRASPINATUS TENDON OF RIGHT SHOULDER, INITIAL ENCOUNTER: Primary | ICD-10-CM

## 2021-04-06 DIAGNOSIS — M47.812 CERVICAL SPONDYLOSIS: ICD-10-CM

## 2021-04-06 RX ORDER — HYDROCODONE BITARTRATE AND ACETAMINOPHEN 5; 325 MG/1; MG/1
1 TABLET ORAL 2 TIMES DAILY
Qty: 14 TABLET | Refills: 0 | Status: SHIPPED
Start: 2021-04-06 | End: 2021-04-13

## 2021-04-13 ENCOUNTER — OFFICE VISIT (OUTPATIENT)
Dept: PAIN MANAGEMENT | Age: 61
End: 2021-04-13
Payer: COMMERCIAL

## 2021-04-13 ENCOUNTER — OFFICE VISIT (OUTPATIENT)
Dept: ORTHOPEDIC SURGERY | Age: 61
End: 2021-04-13
Payer: COMMERCIAL

## 2021-04-13 ENCOUNTER — TELEPHONE (OUTPATIENT)
Dept: PAIN MANAGEMENT | Age: 61
End: 2021-04-13

## 2021-04-13 VITALS
BODY MASS INDEX: 33.13 KG/M2 | DIASTOLIC BLOOD PRESSURE: 84 MMHG | OXYGEN SATURATION: 95 % | TEMPERATURE: 97.1 F | RESPIRATION RATE: 20 BRPM | HEIGHT: 62 IN | HEART RATE: 103 BPM | WEIGHT: 180 LBS | SYSTOLIC BLOOD PRESSURE: 148 MMHG

## 2021-04-13 VITALS — HEIGHT: 62 IN | WEIGHT: 185 LBS | BODY MASS INDEX: 34.04 KG/M2

## 2021-04-13 DIAGNOSIS — M47.816 LUMBAR FACET ARTHROPATHY: ICD-10-CM

## 2021-04-13 DIAGNOSIS — S46.811A TRAUMATIC TEAR OF SUPRASPINATUS TENDON OF RIGHT SHOULDER, INITIAL ENCOUNTER: ICD-10-CM

## 2021-04-13 DIAGNOSIS — M75.41 SHOULDER IMPINGEMENT, RIGHT: Primary | ICD-10-CM

## 2021-04-13 DIAGNOSIS — M25.561 CHRONIC PAIN OF RIGHT KNEE: ICD-10-CM

## 2021-04-13 DIAGNOSIS — G89.29 CHRONIC PAIN OF RIGHT KNEE: ICD-10-CM

## 2021-04-13 DIAGNOSIS — G89.4 CHRONIC PAIN SYNDROME: ICD-10-CM

## 2021-04-13 DIAGNOSIS — G56.03 CARPAL TUNNEL SYNDROME ON BOTH SIDES: ICD-10-CM

## 2021-04-13 DIAGNOSIS — S49.91XA INJURY, SHOULDER AND UPPER ARM, RIGHT, INITIAL ENCOUNTER: ICD-10-CM

## 2021-04-13 DIAGNOSIS — M19.011 PRIMARY OSTEOARTHRITIS OF RIGHT SHOULDER: ICD-10-CM

## 2021-04-13 DIAGNOSIS — M47.812 CERVICAL SPONDYLOSIS: ICD-10-CM

## 2021-04-13 DIAGNOSIS — M54.16 LUMBAR RADICULOPATHY: ICD-10-CM

## 2021-04-13 DIAGNOSIS — M75.121 COMPLETE TEAR OF RIGHT ROTATOR CUFF, UNSPECIFIED WHETHER TRAUMATIC: ICD-10-CM

## 2021-04-13 DIAGNOSIS — S73.191A ACETABULAR LABRUM TEAR, RIGHT, INITIAL ENCOUNTER: ICD-10-CM

## 2021-04-13 DIAGNOSIS — S46.011A TRAUMATIC COMPLETE TEAR OF RIGHT ROTATOR CUFF, INITIAL ENCOUNTER: ICD-10-CM

## 2021-04-13 DIAGNOSIS — M47.816 LUMBAR SPONDYLOSIS: Primary | ICD-10-CM

## 2021-04-13 DIAGNOSIS — M47.812 CERVICAL FACET JOINT SYNDROME: ICD-10-CM

## 2021-04-13 DIAGNOSIS — M51.9 LUMBAR DISC DISORDER: ICD-10-CM

## 2021-04-13 PROBLEM — M25.811 SHOULDER IMPINGEMENT, RIGHT: Status: ACTIVE | Noted: 2021-04-13

## 2021-04-13 PROCEDURE — 99213 OFFICE O/P EST LOW 20 MIN: CPT | Performed by: PAIN MEDICINE

## 2021-04-13 PROCEDURE — 99214 OFFICE O/P EST MOD 30 MIN: CPT | Performed by: PAIN MEDICINE

## 2021-04-13 PROCEDURE — G8417 CALC BMI ABV UP PARAM F/U: HCPCS | Performed by: PAIN MEDICINE

## 2021-04-13 PROCEDURE — G8427 DOCREV CUR MEDS BY ELIG CLIN: HCPCS | Performed by: ORTHOPAEDIC SURGERY

## 2021-04-13 PROCEDURE — 3017F COLORECTAL CA SCREEN DOC REV: CPT | Performed by: PAIN MEDICINE

## 2021-04-13 PROCEDURE — 99204 OFFICE O/P NEW MOD 45 MIN: CPT | Performed by: ORTHOPAEDIC SURGERY

## 2021-04-13 PROCEDURE — G8427 DOCREV CUR MEDS BY ELIG CLIN: HCPCS | Performed by: PAIN MEDICINE

## 2021-04-13 PROCEDURE — 3017F COLORECTAL CA SCREEN DOC REV: CPT | Performed by: ORTHOPAEDIC SURGERY

## 2021-04-13 PROCEDURE — 4004F PT TOBACCO SCREEN RCVD TLK: CPT | Performed by: ORTHOPAEDIC SURGERY

## 2021-04-13 PROCEDURE — G8417 CALC BMI ABV UP PARAM F/U: HCPCS | Performed by: ORTHOPAEDIC SURGERY

## 2021-04-13 PROCEDURE — 4004F PT TOBACCO SCREEN RCVD TLK: CPT | Performed by: PAIN MEDICINE

## 2021-04-13 RX ORDER — HYDROCODONE BITARTRATE AND ACETAMINOPHEN 5; 325 MG/1; MG/1
1 TABLET ORAL 2 TIMES DAILY PRN
Qty: 40 TABLET | Refills: 0 | Status: ON HOLD
Start: 2021-04-13 | End: 2021-04-29 | Stop reason: HOSPADM

## 2021-04-13 NOTE — PROGRESS NOTES
Chief Complaint   Patient presents with    Shoulder Pain     Right shoulder pain for 6 weeks. Shoulder was injured when big dog jumped up on her. Bronson Jeans is a 61y.o. year old   female who is seen today  for evaluation of right shoulder pain. She reports the pain has been ongoing for the past 6 weeks. She does recall a specific injury which started the pain. She reports the pain is worse with activity, better with rest.  The patient does have mechanical symptoms. Shedoes have night pain. She denies a feeling of instability. The prior treatments have been norco 5, ice ,and heat. The patient   has not responded to the treatment. The patient is right hand dominant. The patient is working. 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 Chief Complaint   Patient presents with    Shoulder Pain     Right shoulder pain for 6 weeks. Shoulder was injured when big dog jumped up on her.       Past Medical History:   Diagnosis Date    Anxiety     Chronic back pain     Hypothyroidism     Meniere's disease     Osteoarthritis     Unspecified sleep apnea      Past Surgical History:   Procedure Laterality Date     SECTION      CHOLECYSTECTOMY  2012 - Dr. Kandice Barrett, [facility], Laparoscopic     RIGHT COLECTOMY  2014    Natalia Ayala - laparoscopic right colectomy     TAMMIE-EN-Y GASTRIC BYPASS  2010 - Laparoscopic    TUBAL LIGATION      UPPER GASTROINTESTINAL ENDOSCOPY         Current Outpatient Medications:     fluticasone (FLONASE) 50 MCG/ACT nasal spray, SHAKE LIQUID AND USE 1 SPRAY IN EACH NOSTRIL DAILY, Disp: , Rfl:     SPIRIVA RESPIMAT 1.25 MCG/ACT AERS inhaler, INHALE 2 PUFFS INTO THE LUNGS DAILY, Disp: , Rfl:     nabumetone (RELAFEN) 750 MG tablet, Take 1 tablet by mouth 2 times daily, Disp: 60 tablet, Rfl: 0    levothyroxine (SYNTHROID) 50 MCG tablet, Take 1 tablet by mouth daily, Disp: 90 tablet, Rfl: 0    Naproxen Sodium (ALEVE) 220 MG CAPS, Take 1 tablet by mouth 2 times daily as needed for Pain, Disp: , Rfl:     calcium carbonate (OSCAL) 500 MG TABS tablet, Take 1,000 mg by mouth daily, Disp: , Rfl:     ferrous sulfate 325 (65 Fe) MG tablet, Take 325 mg by mouth daily (with breakfast), Disp: , Rfl:     Multiple Vitamin (MULTIVITAMIN PO), Take 1 tablet by mouth 2 times daily.  Ultra Rodrick , Disp: , Rfl:   Allergies   Allergen Reactions    Meloxicam      Severe stomach pain    Tramadol Other (See Comments)     Makes blisters in mouth     Social History     Socioeconomic History    Marital status:      Spouse name: Not on file    Number of children: Not on file    Years of education: Not on file    Highest education level: Not on file   Occupational History    Not on file   Social Needs    Financial resource strain: Not on file    Food insecurity     Worry: Not on file     Inability: Not on file    Transportation needs     Medical: Not on file     Non-medical: Not on file   Tobacco Use    Smoking status: Current Every Day Smoker     Packs/day: 1.00     Years: 7.00     Pack years: 7.00     Types: Cigarettes     Start date: 1/1/2012    Smokeless tobacco: Never Used    Tobacco comment: Previously smoked for 8 years, then quit for 15 years, then started again   Substance and Sexual Activity    Alcohol use: No    Drug use: Never    Sexual activity: Not Currently   Lifestyle    Physical activity     Days per week: Not on file     Minutes per session: Not on file    Stress: Not on file   Relationships    Social connections     Talks on phone: Not on file     Gets together: Not on file     Attends Mu-ism service: Not on file     Active member of club or organization: Not on file     Attends meetings of clubs or organizations: Not on file     Relationship status: Not on file    Intimate partner violence     Fear of current or ex partner: Not on file     Emotionally abused: Not on file     Physically abused: Not on file     Forced sexual activity: Not on file Other Topics Concern    Not on file   Social History Narrative    Not on file     Family History   Problem Relation Age of Onset    High Blood Pressure Mother     Heart Disease Mother     Obesity Mother     High Blood Pressure Father     High Blood Pressure Brother     Cancer Brother     Obesity Brother        REVIEW OF SYSTEMS:     General/Constitution:  (-)weight loss, (-)fever, (-)chills, (-)weakness. Skin: (-) rash,(-) psoriasis,(-) eczema, (-)skin cancer. Musculoskeletal: (-) fractures,  (-) dislocations,(-) collagen vascular disease, (-) fibromyalgia, (-) multiple sclerosis, (-) muscular dystrophy, (-) RSD,(-) joint pain (-)swelling, (-) joint pain,swelling. Neurologic: (-) epilepsy, (-)seizures,(-) brain tumor,(-) TIA, (-)stroke, (-)headaches, (-)Parkinson disease,(-) memory loss, (-) LOC. Cardiovascular: (-) Chest pain, (-) swelling in legs/feet, (-) SOB, (-) cramping in legs/feet with walking. Respiratory: (-) SOB, (-) Coughing, (-) night sweats. GI: (-) nausea, (-) vomiting, (-) diarrhea, (-) blood in stool, (-) gastric ulcer. Psychiatric: (-) Depression, (-) Anxiety, (-) bipolar disease, (-) Alzheimer's Disease  Allergic/Immunologic: (-) allergies latex, (-) allergies metal, (-) skin sensitivity. Hematlogic: (-) anemia, (-) blood transfusion, (-) DVT/PE, (-) Clotting disorders      Subjective:  _Ht 5' 2\" (1.575 m)   Wt 185 lb (83.9 kg)   LMP  (LMP Unknown)   BMI 33.84 kg/m²  Vital signs are stable. In general, patient is awake, alert and oriented X3, in no apparent distress. Examination of HENT reveals normocephalic, atraumatic. PERRLA/EOMI sclera are white. Conjunctivae are clear. TM's are intact. Pharynx is pink and moist.  Uvula and tongue are midline. Heart: Positive S1 and positive S2 with regular rate and rhythm. Lungs: Clear to auscultation bilaterally without rales, rhonchi or wheezes. Abdomen: soft, nontender. Positive bowel sounds. No organomegaly.   No guarding or rigidity. Constitution:  Ht 5' 2\" (1.575 m)   Wt 185 lb (83.9 kg)   LMP  (LMP Unknown)   BMI 33.84 kg/m²     Psycihatric:  The patient is alert and oriented x 3, appears to be stated age and in no distress. Respiratory:  Respiratory effort is not labored. Patient is not gasping. Palpation of the chest reveals no tactile fremitus. Skin:  Upon inspection: the skin appears warm, dry and intact. There is not a previous scar over the affected area. There is not any cellulitis, lymphedema or cutaneous lesions noted in the lower extremities. Upon palpation there is no induration noted. Neurologic:  Motor exam of the upper extremities show: The reflexes in biceps/triceps/brachioradialis are equal and symmetric. Sensory exam C5-T1 are normal bilaterally. Cardiovascular: The vascular exam is normal and is well perfused to distal extremities. There are 2+ radial pulses bilaterally, and motor and sensation is intact to median, ulnar, and radial, musclocutaneus, and axillary nerve distribution and grossly symmetric bilaterally. There is cap refill noted less than two seconds in all digits. There is not edema of the bilateral upper extremities. There is not varicosities noted in the distal extremities. Lymph:  Upon palpation,  there is no lymphadenopathy noted in bilateral upper extremities. Musculoskeletal:  Gait: normal; examination of the nails and digits reveal no cyanosis or clubbing. Cervical Exam:  On physical exam, Selma Montes is well-developed, well-nourished, oriented to person, place and time. her gait is normal.  On evaluation of hercervical spine, She has full range of motion of the cervical spine without pain. There is no cervical tenderness to palpation. Shoulder Exam:  On evaluation of her bilaterally upper extremities, her right shoulder has no deformity. There is tenderness upon palpation of the greater tuberosity and lateral shoulder.   There is not detail the risks and benefits and outlined the procedure in detail with expected outcomes and possible complications. I also discussed non surgical treatment such as injections (CSI and visco supplementation), physical therapy, topical creams and NSAID's. They have elected for surgical management at this time. Patient will undergo Right shoulder arthroscopy with rotator cuff repair, debridement and subacromial decompression 4/29/2021  The risks and benefits were reviewed with the patient such as: arthorfibrosis shoulder, DVT, infection,  injuries to blood vessels and nerves, non relief of symptoms, recurrent tear, continued pain, worsening of symptoms. The patient was counseled at length about the risks of adriana Covid-19 during their perioperative period and any recovery window from their procedure. The patient was made aware that adriana Covid-19  may worsen their prognosis for recovering from their procedure  and lend to a higher morbidity and/or mortality risk. All material risks, benefits, and reasonable alternatives including postponing the procedure were discussed. The patient does wish to proceed with the procedure at this time.

## 2021-04-13 NOTE — PROGRESS NOTES
[address] [facility], [address] [place] [address] Tomás  [phone]    Follow up Note      Antione Bronson     Date of Visit:  4/13/2021    CC:  Patient presents for follow up   Chief Complaint   Patient presents with    Follow-up    Pain     back , right arm and right knee     HPI:  Follow up on her neck/low back/right knee/bilateral hand/right shoulder pain. Appropriate analgesia with current medications regimen: No  Change in quality of symptoms:none  Medication side effects:No  Recent diagnostic testing:Right shoulder MRI  Recent interventional procedures:none     She has not been on anticoagulation medications to include none. The patient Yemi Lynn not been on herbal supplements.  The patient is not diabetic.     Imaging:   Right shoulder MRI 2021:  1. Retracted full-thickness tearing of mid and posterior supraspinatus and   entirety of infraspinatus with retraction of the torn fibers from the   footplate measuring up to 2.9 cm.   2. Moderate atrophy and fatty degeneration of infraspinatus. 3. Mild edema in the infraspinatus and posterior supraspinatus muscle belly. 4. Mild tendinosis of the intra-articular long head of the biceps tendon. 5. Tearing of the posterior labrum.  Mild underlying diffuse labral   degeneration. 6. Mild glenohumeral chondromalacia. 7. Mild degenerative change of the right AC joint. Right shoulder Xray 2021:  1.  Minimal degenerative changes at the glenohumeral and   acromioclavicular joint    Lumbar spine MRI 05/2017  1.  Degenerative changes, most significant disc herniation is at   L2-L3.      CT cervical spine 2016  1.  No evidence for acute fracture or dislocation of cervical spine.       2. Mild reversal of normal cervical lordosis with degenerative changes   at C5-C6 level      Right knee Xray 2020:  Mild degenerative changes.     Previous treatments: Physical Therapy and medications.                                        Potential Aberrant Drug-Related Behavior:    No     Urine Drug Screening:  None     OARRS report:  2021 consistent. Past Medical History:   Diagnosis Date    Anxiety     Chronic back pain     Hypothyroidism     Meniere's disease     Osteoarthritis     Unspecified sleep apnea      Past Surgical History:   Procedure Laterality Date     SECTION      CHOLECYSTECTOMY  2012 - Dr. Kandice Barrett, [facility], Laparoscopic     RIGHT COLECTOMY  2014    Natalia Jumbo - laparoscopic right colectomy     TAMMIE-EN-Y GASTRIC BYPASS  2010 - Laparoscopic    TUBAL LIGATION      UPPER GASTROINTESTINAL ENDOSCOPY       Prior to Admission medications    Medication Sig Start Date End Date Taking? Authorizing Provider   fluticasone (FLONASE) 50 MCG/ACT nasal spray as needed  3/22/21  Yes Historical Provider, MD   levothyroxine (SYNTHROID) 50 MCG tablet Take 1 tablet by mouth daily 3/4/21  Yes Yasemin Castillo Catterlin, DO   calcium carbonate (OSCAL) 500 MG TABS tablet Take 1,000 mg by mouth daily   Yes Historical Provider, MD   ferrous sulfate 325 (65 Fe) MG tablet Take 325 mg by mouth daily (with breakfast)   Yes Historical Provider, MD   Multiple Vitamin (MULTIVITAMIN PO) Take 1 tablet by mouth 2 times daily.  Ultra Rodrick    Yes Historical Provider, MD     Allergies   Allergen Reactions    Meloxicam      Severe stomach pain    Tramadol Other (See Comments)     Makes blisters in mouth     Social History     Socioeconomic History    Marital status:      Spouse name: Not on file    Number of children: Not on file    Years of education: Not on file    Highest education level: Not on file   Occupational History    Not on file   Social Needs    Financial resource strain: Not on file    Food insecurity     Worry: Not on file     Inability: Not on file    Transportation needs     Medical: Not on file     Non-medical: Not on file   Tobacco Use    Smoking status: Current Every Day Smoker     Packs/day: 1.00     Years: 7.00 Pack years: 7.00     Types: Cigarettes     Start date: 1/1/2012    Smokeless tobacco: Never Used    Tobacco comment: Previously smoked for 8 years, then quit for 15 years, then started again   Substance and Sexual Activity    Alcohol use: No    Drug use: Never    Sexual activity: Not Currently   Lifestyle    Physical activity     Days per week: Not on file     Minutes per session: Not on file    Stress: Not on file   Relationships    Social connections     Talks on phone: Not on file     Gets together: Not on file     Attends [employer] service: Not on file     Active member of club or organization: Not on file     Attends meetings of clubs or organizations: Not on file     Relationship status: Not on file    Intimate partner violence     Fear of current or ex partner: Not on file     Emotionally abused: Not on file     Physically abused: Not on file     Forced sexual activity: Not on file   Other Topics Concern    Not on file   Social History Narrative    Not on file     Family History   Problem Relation Age of Onset    High Blood Pressure Mother     Heart Disease Mother     Obesity Mother     High Blood Pressure Father     High Blood Pressure Brother     Cancer Brother     Obesity Brother      REVIEW OF SYSTEMS:     Willma Relic denies fever/chills, chest pain, shortness of breath, new bowel or bladder complaints. All other review of systems was negative. PHYSICAL EXAMINATION:      BP (!) 148/84   Pulse 103   Temp 97.1 °F (36.2 °C)   Resp 20   Ht 5' 2\" (1.575 m)   Wt 180 lb (81.6 kg)   LMP  (LMP Unknown)   SpO2 95%   BMI 32.92 kg/m²     General:       General appearance:   pleasant and well-hydrated.    , in moderate discomfort and A & O x3  Build:Overweight     HEENT:     Head:normocephalic and atraumatic  Sclera: icterus absent,      Lungs:     Breathing:Breathing Pattern: regular, no distress     Abdomen:     Shape:non-distended and normal  Tenderness:none     Cervical spine:    Inspection:normal  Palpation:tenderness paravertebral muscles, facet loading, left, positive and tenderness. Range of motion:abnormal mildly flexion, extension rotation bilateral and is  painful.     Lumbar spine:     Spine inspection:normal   CVA tenderness:No   Palpation:tenderness paravertebral muscles, facet loading, right, positive and tenderness. Range of motion:abnormal moderately Lateral bending, flexion, extension rotation right and is  painful.     Musculoskeletal:     Trigger points in Paraveteral:absent bilaterally  SI joint tenderness:positive right, positive left  SLR:negative right, negative left, sitting      Extremities:     Tremors:None bilaterally upper and lower  Range of motion:Limited extension and abduction of right shoulder, pain with internal rotation of hips negative. Intact:Yes  Edema:Normal     Neurological:     Sensory:normal to light touch bilateral upper and lower extremities   Positive bilateral tinel sign median nerve. Motor:              Right Bicep3/5           Left Bicep5/5              Right Triceps3/5       Left Triceps5/5          Right Deltoid3/5     Left Deltoid5/5                  Reflexes:    Right Brachioradialis reflexnot done  Left Brachioradialis reflex2+  Right Biceps reflex not done  Left Biceps reflex2+  Right Triceps reflex not done  Left Triceps reflex2+  Gait:normal     Dermatology:     Skin:no unusual rashes and no skin lesions     Impression:  Neck and low back pain with radiation to the Right lower extremity   Lumbar spine MRI multilevel degenerative disc disease with facet arthropathy  Cervical spine CT C5-6 degenerative disc disease  Patient had recently moved to [place] from [place], she was seeing pain management there. She had physical therapy and was on Norco 10/325 QID/x 5   Continues to benefit from right knee injection, will consider synvisc injection if needed later.   Plan:  Follow up on her neck/low back/right knee/bilateral hands pain and right shoulder pain. Results of right shoulder MRI were discussed with the patient. Patient had seen  and is scheduled for right rotator cuff surgery on the 29th. Patient is s/p right shoulder injection with good outcome until her dog jumped on her. Discontinue Tylenol # 3 QD, will start patient on [place] 5/325 BID. Discontinue Mobic 7.5 mg QD  OARRS report reviewed 04/2021. Saliva screen and opioid agreement today. Treatment plan discussed with the patient including medications side effects. We discussed with the patient that combining opioids, benzodiazepines, alcohol, illicit drugs or sleep aids increases the risk of respiratory depression including death. We discussed that these medications may cause drowsiness, sedation or dizziness and have counseled the patient not to drive or operate machinery. We have discussed that these medications will be prescribed only by one provider. We have discussed with the patient about age related risk factors and have thoroughly discussed the importance of taking these medications as prescribed. The patient verbalizes understanding. jeovany Silverio M.D.

## 2021-04-14 ENCOUNTER — OFFICE VISIT (OUTPATIENT)
Dept: FAMILY MEDICINE CLINIC | Age: 61
End: 2021-04-14
Payer: COMMERCIAL

## 2021-04-14 VITALS
HEIGHT: 62 IN | WEIGHT: 207 LBS | OXYGEN SATURATION: 98 % | DIASTOLIC BLOOD PRESSURE: 78 MMHG | BODY MASS INDEX: 38.09 KG/M2 | TEMPERATURE: 98.3 F | SYSTOLIC BLOOD PRESSURE: 142 MMHG | HEART RATE: 93 BPM | RESPIRATION RATE: 16 BRPM

## 2021-04-14 DIAGNOSIS — Z12.11 SCREENING FOR COLON CANCER: ICD-10-CM

## 2021-04-14 DIAGNOSIS — I10 ESSENTIAL HYPERTENSION: ICD-10-CM

## 2021-04-14 DIAGNOSIS — E66.01 MORBID OBESITY (HCC): ICD-10-CM

## 2021-04-14 DIAGNOSIS — R53.83 FATIGUE, UNSPECIFIED TYPE: ICD-10-CM

## 2021-04-14 DIAGNOSIS — M75.121 COMPLETE TEAR OF RIGHT ROTATOR CUFF, UNSPECIFIED WHETHER TRAUMATIC: ICD-10-CM

## 2021-04-14 DIAGNOSIS — E78.2 MIXED HYPERLIPIDEMIA: ICD-10-CM

## 2021-04-14 DIAGNOSIS — R73.01 IFG (IMPAIRED FASTING GLUCOSE): ICD-10-CM

## 2021-04-14 DIAGNOSIS — Z01.818 PREOP EXAMINATION: Primary | ICD-10-CM

## 2021-04-14 DIAGNOSIS — H81.02 MENIERE'S DISEASE OF LEFT EAR: ICD-10-CM

## 2021-04-14 DIAGNOSIS — Z01.818 PREOP EXAMINATION: ICD-10-CM

## 2021-04-14 LAB
HBA1C MFR BLD: 5.7 % (ref 4–5.6)
HCT VFR BLD CALC: 45.4 % (ref 34–48)
HEMOGLOBIN: 13.8 G/DL (ref 11.5–15.5)
MCH RBC QN AUTO: 30.7 PG (ref 26–35)
MCHC RBC AUTO-ENTMCNC: 30.4 % (ref 32–34.5)
MCV RBC AUTO: 101.1 FL (ref 80–99.9)
PDW BLD-RTO: 14.2 FL (ref 11.5–15)
PLATELET # BLD: 228 E9/L (ref 130–450)
PMV BLD AUTO: 11.8 FL (ref 7–12)
RBC # BLD: 4.49 E12/L (ref 3.5–5.5)
WBC # BLD: 9.9 E9/L (ref 4.5–11.5)

## 2021-04-14 PROCEDURE — 4004F PT TOBACCO SCREEN RCVD TLK: CPT | Performed by: FAMILY MEDICINE

## 2021-04-14 PROCEDURE — 3017F COLORECTAL CA SCREEN DOC REV: CPT | Performed by: FAMILY MEDICINE

## 2021-04-14 PROCEDURE — 99214 OFFICE O/P EST MOD 30 MIN: CPT | Performed by: FAMILY MEDICINE

## 2021-04-14 PROCEDURE — 93000 ELECTROCARDIOGRAM COMPLETE: CPT | Performed by: FAMILY MEDICINE

## 2021-04-14 PROCEDURE — G8417 CALC BMI ABV UP PARAM F/U: HCPCS | Performed by: FAMILY MEDICINE

## 2021-04-14 PROCEDURE — G8427 DOCREV CUR MEDS BY ELIG CLIN: HCPCS | Performed by: FAMILY MEDICINE

## 2021-04-14 RX ORDER — MECLIZINE HYDROCHLORIDE 25 MG/1
25 TABLET ORAL 3 TIMES DAILY PRN
Qty: 50 TABLET | Refills: 2 | Status: SHIPPED | OUTPATIENT
Start: 2021-04-14 | End: 2021-06-03

## 2021-04-14 RX ORDER — HYDROCHLOROTHIAZIDE 25 MG/1
25 TABLET ORAL EVERY MORNING
Qty: 30 TABLET | Refills: 5 | Status: SHIPPED
Start: 2021-04-14 | End: 2021-09-22

## 2021-04-15 LAB
ALBUMIN SERPL-MCNC: 4 G/DL (ref 3.5–5.2)
ALP BLD-CCNC: 99 U/L (ref 35–104)
ALT SERPL-CCNC: 14 U/L (ref 0–32)
ANION GAP SERPL CALCULATED.3IONS-SCNC: 15 MMOL/L (ref 7–16)
AST SERPL-CCNC: 33 U/L (ref 0–31)
BILIRUB SERPL-MCNC: <0.2 MG/DL (ref 0–1.2)
BUN BLDV-MCNC: 9 MG/DL (ref 8–23)
CALCIUM SERPL-MCNC: 9.9 MG/DL (ref 8.6–10.2)
CHLORIDE BLD-SCNC: 105 MMOL/L (ref 98–107)
CHOLESTEROL, TOTAL: 154 MG/DL (ref 0–199)
CO2: 21 MMOL/L (ref 22–29)
CREAT SERPL-MCNC: 0.6 MG/DL (ref 0.5–1)
GFR AFRICAN AMERICAN: >60
GFR NON-AFRICAN AMERICAN: >60 ML/MIN/1.73
GLUCOSE BLD-MCNC: 67 MG/DL (ref 74–99)
HDLC SERPL-MCNC: 34 MG/DL
LDL CHOLESTEROL CALCULATED: 77 MG/DL (ref 0–99)
POTASSIUM SERPL-SCNC: 4.6 MMOL/L (ref 3.5–5)
SODIUM BLD-SCNC: 141 MMOL/L (ref 132–146)
TOTAL PROTEIN: 6.9 G/DL (ref 6.4–8.3)
TRIGL SERPL-MCNC: 217 MG/DL (ref 0–149)
TSH SERPL DL<=0.05 MIU/L-ACNC: 1.43 UIU/ML (ref 0.27–4.2)
VLDLC SERPL CALC-MCNC: 43 MG/DL

## 2021-04-20 PROBLEM — E66.01 MORBID OBESITY (HCC): Status: ACTIVE | Noted: 2021-04-20

## 2021-04-20 PROBLEM — I10 ESSENTIAL HYPERTENSION: Status: ACTIVE | Noted: 2021-04-20

## 2021-04-20 PROBLEM — R53.83 FATIGUE: Status: ACTIVE | Noted: 2021-04-20

## 2021-04-20 PROBLEM — Z12.11 SCREENING FOR COLON CANCER: Status: ACTIVE | Noted: 2021-04-20

## 2021-04-20 PROBLEM — Z01.818 PREOP EXAMINATION: Status: ACTIVE | Noted: 2021-04-20

## 2021-04-20 ASSESSMENT — ENCOUNTER SYMPTOMS
COLOR CHANGE: 0
CHOKING: 0
RHINORRHEA: 0
BACK PAIN: 0
ALLERGIC/IMMUNOLOGIC NEGATIVE: 1
ABDOMINAL PAIN: 0
TROUBLE SWALLOWING: 0
EYE ITCHING: 0
ABDOMINAL DISTENTION: 0
CHEST TIGHTNESS: 0
DIARRHEA: 0
SHORTNESS OF BREATH: 0
BLOOD IN STOOL: 0
NAUSEA: 0
SORE THROAT: 0
SINUS PAIN: 0
EYE DISCHARGE: 0
EYE PAIN: 0
FACIAL SWELLING: 0
RECTAL PAIN: 0
EYE REDNESS: 0
STRIDOR: 0
COUGH: 0
VOICE CHANGE: 0
SINUS PRESSURE: 0
WHEEZING: 0
VOMITING: 0
CONSTIPATION: 0
EYES NEGATIVE: 1
PHOTOPHOBIA: 0
RESPIRATORY NEGATIVE: 1
ANAL BLEEDING: 0

## 2021-04-20 NOTE — PROGRESS NOTES
Gino Baldwin is a 61 y.o. female. HPI/Chief C/O:  Chief Complaint   Patient presents with    Pre-op Exam     Right rotator cuff repair, Dr Rachael Lorenzo, scheduled for 4/29/2021     Allergies   Allergen Reactions    Meloxicam      Severe stomach pain    Tramadol Other (See Comments)     Makes blisters in mouth     This 61year old female presents for pre-op clearance for complete right rotator cuff tear on 4/29/201. Pt has menieres  Disease of left ear, hypertension, hyperlipidemia, and fatigue. ROS:  Review of Systems   Constitutional: Positive for fatigue. Negative for activity change, appetite change, chills, diaphoresis, fever and unexpected weight change. HENT: Negative. Negative for congestion, dental problem, drooling, ear discharge, ear pain, facial swelling, hearing loss, mouth sores, nosebleeds, postnasal drip, rhinorrhea, sinus pressure, sinus pain, sneezing, sore throat, tinnitus, trouble swallowing and voice change. Eyes: Negative. Negative for photophobia, pain, discharge, redness, itching and visual disturbance. Respiratory: Negative. Negative for cough, choking, chest tightness, shortness of breath, wheezing and stridor. Cardiovascular: Negative. Negative for chest pain, palpitations and leg swelling. Gastrointestinal: Negative for abdominal distention, abdominal pain, anal bleeding, blood in stool, constipation, diarrhea, nausea, rectal pain and vomiting. Endocrine: Negative. Negative for cold intolerance, heat intolerance, polydipsia, polyphagia and polyuria. Genitourinary: Negative. Negative for decreased urine volume, difficulty urinating, dysuria, flank pain, frequency, genital sores, hematuria, menstrual problem, pelvic pain and urgency. Musculoskeletal: Positive for arthralgias, joint swelling and myalgias. Negative for back pain, gait problem, neck pain and neck stiffness. Skin: Negative. Negative for color change, pallor, rash and wound. Allergic/Immunologic: Negative. Neurological: Positive for dizziness. Negative for tremors, seizures, syncope, facial asymmetry, speech difficulty, weakness, light-headedness, numbness and headaches. Hematological: Negative. Negative for adenopathy. Does not bruise/bleed easily. Psychiatric/Behavioral: Negative. Negative for agitation, behavioral problems, confusion, decreased concentration, dysphoric mood, hallucinations, self-injury, sleep disturbance and suicidal ideas. The patient is not nervous/anxious and is not hyperactive.          Past Medical/Surgical Hx;  Reviewed with patient      Diagnosis Date    Anxiety     Chronic back pain     Hypothyroidism     Meniere's disease     Osteoarthritis     Unspecified sleep apnea      Past Surgical History:   Procedure Laterality Date     SECTION      CHOLECYSTECTOMY  2012 - Dr. Brent Wells, [facility], Laparoscopic     RIGHT COLECTOMY  2014    [facility] - laparoscopic right colectomy     TAMMIE-EN-Y GASTRIC BYPASS  2010 - Laparoscopic    TUBAL LIGATION      UPPER GASTROINTESTINAL ENDOSCOPY         Past Family Hx:  Reviewed with patient      Problem Relation Age of Onset    High Blood Pressure Mother     Heart Disease Mother     Obesity Mother     High Blood Pressure Father     High Blood Pressure Brother     Cancer Brother     Obesity Brother        Social Hx:  Reviewed with patient  Social History     Tobacco Use    Smoking status: Current Every Day Smoker     Packs/day: 1.00     Years: 7.00     Pack years: 7.00     Types: Cigarettes     Start date: 2012    Smokeless tobacco: Never Used    Tobacco comment: Previously smoked for 8 years, then quit for 15 years, then started again   Substance Use Topics    Alcohol use: No       OBJECTIVE  BP (!) 142/78   Pulse 93   Temp 98.3 °F (36.8 °C)   Resp 16   Ht 5' 2\" (1.575 m)   Wt 207 lb (93.9 kg)   LMP  (LMP Unknown)   SpO2 98%   BMI 37.86 kg/m²     Problem List:  Schuyler Kay does not have any pertinent problems on file. PHYS EX:  Physical Exam  Vitals signs and nursing note reviewed. Constitutional:       General: She is not in acute distress. Appearance: Normal appearance. She is well-developed. She is obese. She is not ill-appearing, toxic-appearing or diaphoretic. Comments: Patient has morbid obesity. Patient instructed on low calorie, healthy diet. HENT:      Head: Normocephalic and atraumatic. Right Ear: Tympanic membrane, ear canal and external ear normal. There is no impacted cerumen. Left Ear: Tympanic membrane, ear canal and external ear normal. There is no impacted cerumen. Nose: Nose normal. No congestion or rhinorrhea. Mouth/Throat:      Mouth: Mucous membranes are moist.      Pharynx: Oropharynx is clear. No oropharyngeal exudate or posterior oropharyngeal erythema. Eyes:      General: No scleral icterus. Right eye: No discharge. Left eye: No discharge. Conjunctiva/sclera: Conjunctivae normal.      Pupils: Pupils are equal, round, and reactive to light. Neck:      Musculoskeletal: Normal range of motion and neck supple. No neck rigidity or muscular tenderness. Thyroid: No thyromegaly. Vascular: No carotid bruit or JVD. Trachea: No tracheal deviation. Cardiovascular:      Rate and Rhythm: Normal rate and regular rhythm. Pulses: Normal pulses. Heart sounds: Normal heart sounds. No murmur. No friction rub. No gallop. Pulmonary:      Effort: Pulmonary effort is normal. No respiratory distress. Breath sounds: Normal breath sounds. No stridor. No wheezing, rhonchi or rales. Chest:      Chest wall: No tenderness. Abdominal:      General: Bowel sounds are normal. There is no distension. Palpations: Abdomen is soft. There is no mass. Tenderness: There is no abdominal tenderness. There is no right CVA tenderness, left CVA tenderness, guarding or rebound.       Hernia: No hernia is present. Musculoskeletal:         General: Tenderness present. No swelling, deformity or signs of injury. Right lower leg: No edema. Left lower leg: No edema. Comments: Pain and decreased ROM right shoulder. Lymphadenopathy:      Cervical: No cervical adenopathy. Skin:     General: Skin is warm. Coloration: Skin is not jaundiced or pale. Findings: No bruising, erythema, lesion or rash. Neurological:      General: No focal deficit present. Mental Status: She is alert and oriented to person, place, and time. Cranial Nerves: No cranial nerve deficit. Sensory: No sensory deficit. Motor: No weakness or abnormal muscle tone. Coordination: Coordination normal.      Gait: Gait normal.      Deep Tendon Reflexes: Reflexes are normal and symmetric. Reflexes normal.   Psychiatric:         Behavior: Behavior normal.         Thought Content: Thought content normal.         Judgment: Judgment normal.         ASSESSMENT/PLAN  Vera Ramirez was seen today for pre-op exam.    Diagnoses and all orders for this visit:    Preop examination  -     EKG 12 lead; Future  -     CBC; Future  -     COMPREHENSIVE METABOLIC PANEL; Future  -     EKG 12 lead    Meniere's disease of left ear  -     CBC; Future  -     COMPREHENSIVE METABOLIC PANEL; Future  -     meclizine (ANTIVERT) 25 MG tablet; Take 1 tablet by mouth 3 times daily as needed for Dizziness    Complete tear of right rotator cuff, unspecified whether traumatic  -     CBC; Future  -     COMPREHENSIVE METABOLIC PANEL; Future  Pt is cleared for surgery. Mixed hyperlipidemia  -     CBC; Future  -     COMPREHENSIVE METABOLIC PANEL; Future  -     LIPID PANEL; Future    IFG (impaired fasting glucose)  -     CBC; Future  -     COMPREHENSIVE METABOLIC PANEL; Future  -     HEMOGLOBIN A1C; Future    Fatigue, unspecified type  -     CBC; Future  -     COMPREHENSIVE METABOLIC PANEL; Future  -     TSH;  Future    Screening for colon cancer -     CBC; Future  -     COMPREHENSIVE METABOLIC PANEL; Future  -     Cologuard (For External Results Only); Future    Essential hypertension  -     CBC; Future  -     COMPREHENSIVE METABOLIC PANEL; Future  -     hydroCHLOROthiazide (HYDRODIURIL) 25 MG tablet; Take 1 tablet by mouth every morning    Morbid obesity  Patient has morbid obesity. Patient instructed on low calorie, healthy diet. Pt instructed if any worse go ED ASAP. Outpatient Encounter Medications as of 4/14/2021   Medication Sig Dispense Refill    hydroCHLOROthiazide (HYDRODIURIL) 25 MG tablet Take 1 tablet by mouth every morning 30 tablet 5    meclizine (ANTIVERT) 25 MG tablet Take 1 tablet by mouth 3 times daily as needed for Dizziness 50 tablet 2    HYDROcodone-acetaminophen (NORCO) 5-325 MG per tablet Take 1 tablet by mouth 2 times daily as needed for Pain for up to 20 days. 40 tablet 0    fluticasone (FLONASE) 50 MCG/ACT nasal spray as needed       levothyroxine (SYNTHROID) 50 MCG tablet Take 1 tablet by mouth daily 90 tablet 0    calcium carbonate (OSCAL) 500 MG TABS tablet Take 1,000 mg by mouth daily      ferrous sulfate 325 (65 Fe) MG tablet Take 325 mg by mouth daily (with breakfast)      Multiple Vitamin (MULTIVITAMIN PO) Take 1 tablet by mouth 2 times daily. Ultra Rodrick        No facility-administered encounter medications on file as of 4/14/2021. Return in about 3 months (around 7/14/2021).         Reviewed recent labs related to Dolores's current problems      Discussed importance of regular Health Maintenance follow up  Health Maintenance   Topic    Hepatitis C screen     Pneumococcal 0-64 years Vaccine (1 of 1 - PPSV23)    HIV screen     COVID-19 Vaccine (1)    DTaP/Tdap/Td vaccine (1 - Tdap)    Cervical cancer screen     Shingles Vaccine (1 of 2)    Flu vaccine (Season Ended)    A1C test (Diabetic or Prediabetic)     TSH testing     Potassium monitoring     Creatinine monitoring     Breast cancer screen     Lipid screen     Colon cancer screen colonoscopy     Hepatitis A vaccine     Hepatitis B vaccine     Hib vaccine     Meningococcal (ACWY) vaccine

## 2021-04-21 ENCOUNTER — ANESTHESIA EVENT (OUTPATIENT)
Dept: OPERATING ROOM | Age: 61
End: 2021-04-21
Payer: COMMERCIAL

## 2021-04-23 ENCOUNTER — HOSPITAL ENCOUNTER (OUTPATIENT)
Age: 61
Discharge: HOME | End: 2021-04-25
Payer: COMMERCIAL

## 2021-04-23 DIAGNOSIS — M75.101 TEAR OF RIGHT ROTATOR CUFF, UNSPECIFIED TEAR EXTENT, UNSPECIFIED WHETHER TRAUMATIC: ICD-10-CM

## 2021-04-23 PROCEDURE — U0003 INFECTIOUS AGENT DETECTION BY NUCLEIC ACID (DNA OR RNA); SEVERE ACUTE RESPIRATORY SYNDROME CORONAVIRUS 2 (SARS-COV-2) (CORONAVIRUS DISEASE [COVID-19]), AMPLIFIED PROBE TECHNIQUE, MAKING USE OF HIGH THROUGHPUT TECHNOLOGIES AS DESCRIBED BY CMS-2020-01-R: HCPCS

## 2021-04-25 LAB
SARS-COV-2: NOT DETECTED
SOURCE: NORMAL

## 2021-04-27 ASSESSMENT — LIFESTYLE VARIABLES: SMOKING_STATUS: 1

## 2021-04-27 NOTE — ANESTHESIA PRE PROCEDURE
shot Right pre Operative Inter Scaline Brachial Plexus Nerve Block.----explained; consents  )  Induction: intravenous. BIS  MIPS: Postoperative opioids intended and Prophylactic antiemetics administered. Anesthetic plan and risks discussed with patient. Plan discussed with CRNA.     Attending anesthesiologist reviewed and agrees with Brian Colorado MD   4/27/2021

## 2021-04-28 NOTE — PROGRESS NOTES
Overdue results letter mailed to patient regarding cologuard order.   Electronically signed by Ashley Cevallos on 4/28/2021 at 3:40 PM

## 2021-04-29 ENCOUNTER — HOSPITAL ENCOUNTER (OUTPATIENT)
Age: 61
Setting detail: OUTPATIENT SURGERY
Discharge: HOME | End: 2021-04-29
Attending: ORTHOPAEDIC SURGERY | Admitting: ORTHOPAEDIC SURGERY
Payer: COMMERCIAL

## 2021-04-29 ENCOUNTER — ANESTHESIA (OUTPATIENT)
Dept: OPERATING ROOM | Age: 61
End: 2021-04-29
Payer: COMMERCIAL

## 2021-04-29 VITALS
DIASTOLIC BLOOD PRESSURE: 88 MMHG | BODY MASS INDEX: 36.14 KG/M2 | HEART RATE: 88 BPM | OXYGEN SATURATION: 94 % | SYSTOLIC BLOOD PRESSURE: 178 MMHG | TEMPERATURE: 97 F | WEIGHT: 204 LBS | HEIGHT: 63 IN | RESPIRATION RATE: 16 BRPM

## 2021-04-29 VITALS
SYSTOLIC BLOOD PRESSURE: 208 MMHG | OXYGEN SATURATION: 100 % | DIASTOLIC BLOOD PRESSURE: 146 MMHG | RESPIRATION RATE: 18 BRPM

## 2021-04-29 DIAGNOSIS — M75.101 TEAR OF RIGHT ROTATOR CUFF, UNSPECIFIED TEAR EXTENT, UNSPECIFIED WHETHER TRAUMATIC: Primary | ICD-10-CM

## 2021-04-29 DIAGNOSIS — S46.011A TRAUMATIC COMPLETE TEAR OF RIGHT ROTATOR CUFF, INITIAL ENCOUNTER: ICD-10-CM

## 2021-04-29 DIAGNOSIS — M75.41 SHOULDER IMPINGEMENT, RIGHT: ICD-10-CM

## 2021-04-29 PROCEDURE — 3700000000 HC ANESTHESIA ATTENDED CARE: Performed by: ORTHOPAEDIC SURGERY

## 2021-04-29 PROCEDURE — C1713 ANCHOR/SCREW BN/BN,TIS/BN: HCPCS | Performed by: ORTHOPAEDIC SURGERY

## 2021-04-29 PROCEDURE — 6360000002 HC RX W HCPCS: Performed by: NURSE PRACTITIONER

## 2021-04-29 PROCEDURE — 2500000003 HC RX 250 WO HCPCS: Performed by: NURSE ANESTHETIST, CERTIFIED REGISTERED

## 2021-04-29 PROCEDURE — 7100000010 HC PHASE II RECOVERY - FIRST 15 MIN: Performed by: ORTHOPAEDIC SURGERY

## 2021-04-29 PROCEDURE — 2580000003 HC RX 258: Performed by: ORTHOPAEDIC SURGERY

## 2021-04-29 PROCEDURE — 2720000010 HC SURG SUPPLY STERILE: Performed by: ORTHOPAEDIC SURGERY

## 2021-04-29 PROCEDURE — 2580000003 HC RX 258: Performed by: ANESTHESIOLOGY

## 2021-04-29 PROCEDURE — 3700000001 HC ADD 15 MINUTES (ANESTHESIA): Performed by: ORTHOPAEDIC SURGERY

## 2021-04-29 PROCEDURE — 2709999900 HC NON-CHARGEABLE SUPPLY: Performed by: ORTHOPAEDIC SURGERY

## 2021-04-29 PROCEDURE — 29827 SHO ARTHRS SRG RT8TR CUF RPR: CPT | Performed by: ORTHOPAEDIC SURGERY

## 2021-04-29 PROCEDURE — 64415 NJX AA&/STRD BRCH PLXS IMG: CPT | Performed by: ANESTHESIOLOGY

## 2021-04-29 PROCEDURE — 3600000014 HC SURGERY LEVEL 4 ADDTL 15MIN: Performed by: ORTHOPAEDIC SURGERY

## 2021-04-29 PROCEDURE — 29823 SHO ARTHRS SRG XTNSV DBRDMT: CPT | Performed by: ORTHOPAEDIC SURGERY

## 2021-04-29 PROCEDURE — 6360000002 HC RX W HCPCS: Performed by: NURSE ANESTHETIST, CERTIFIED REGISTERED

## 2021-04-29 PROCEDURE — 29826 SHO ARTHRS SRG DECOMPRESSION: CPT | Performed by: ORTHOPAEDIC SURGERY

## 2021-04-29 PROCEDURE — 29824 SHO ARTHRS SRG DSTL CLAVICLC: CPT | Performed by: ORTHOPAEDIC SURGERY

## 2021-04-29 PROCEDURE — 3600000004 HC SURGERY LEVEL 4 BASE: Performed by: ORTHOPAEDIC SURGERY

## 2021-04-29 PROCEDURE — 7100000011 HC PHASE II RECOVERY - ADDTL 15 MIN: Performed by: ORTHOPAEDIC SURGERY

## 2021-04-29 PROCEDURE — 7100000000 HC PACU RECOVERY - FIRST 15 MIN: Performed by: ORTHOPAEDIC SURGERY

## 2021-04-29 PROCEDURE — 6360000002 HC RX W HCPCS: Performed by: ORTHOPAEDIC SURGERY

## 2021-04-29 DEVICE — ANCHOR SUT L19.1MM DIA4.75MM BIOCOMPOSITE FULL THRD: Type: IMPLANTABLE DEVICE | Status: FUNCTIONAL

## 2021-04-29 DEVICE — ANCHOR SUTURE 4.75X19.1 MM TIG TAPE LOOP WHT BLK PUSHLOCK: Type: IMPLANTABLE DEVICE | Status: FUNCTIONAL

## 2021-04-29 DEVICE — ANCHOR SUT L24.5MM DIA4.75MM BIOCOMPOSITE SELF PUNCHING: Type: IMPLANTABLE DEVICE | Status: FUNCTIONAL

## 2021-04-29 RX ORDER — DIPHENHYDRAMINE HYDROCHLORIDE 50 MG/ML
12.5 INJECTION INTRAMUSCULAR; INTRAVENOUS
Status: DISCONTINUED | OUTPATIENT
Start: 2021-04-29 | End: 2021-04-29 | Stop reason: HOSPADM

## 2021-04-29 RX ORDER — HYDROCODONE BITARTRATE AND ACETAMINOPHEN 5; 325 MG/1; MG/1
1 TABLET ORAL PRN
Status: DISCONTINUED | OUTPATIENT
Start: 2021-04-29 | End: 2021-04-29 | Stop reason: HOSPADM

## 2021-04-29 RX ORDER — GLYCOPYRROLATE 1 MG/5 ML
SYRINGE (ML) INTRAVENOUS PRN
Status: DISCONTINUED | OUTPATIENT
Start: 2021-04-29 | End: 2021-04-29 | Stop reason: SDUPTHER

## 2021-04-29 RX ORDER — ONDANSETRON 2 MG/ML
INJECTION INTRAMUSCULAR; INTRAVENOUS PRN
Status: DISCONTINUED | OUTPATIENT
Start: 2021-04-29 | End: 2021-04-29 | Stop reason: SDUPTHER

## 2021-04-29 RX ORDER — SODIUM CHLORIDE, SODIUM LACTATE, POTASSIUM CHLORIDE, CALCIUM CHLORIDE 600; 310; 30; 20 MG/100ML; MG/100ML; MG/100ML; MG/100ML
INJECTION, SOLUTION INTRAVENOUS CONTINUOUS
Status: DISCONTINUED | OUTPATIENT
Start: 2021-04-29 | End: 2021-04-29 | Stop reason: HOSPADM

## 2021-04-29 RX ORDER — HYDROCODONE BITARTRATE AND ACETAMINOPHEN 5; 325 MG/1; MG/1
2 TABLET ORAL PRN
Status: DISCONTINUED | OUTPATIENT
Start: 2021-04-29 | End: 2021-04-29 | Stop reason: HOSPADM

## 2021-04-29 RX ORDER — LABETALOL HYDROCHLORIDE 5 MG/ML
5 INJECTION, SOLUTION INTRAVENOUS EVERY 10 MIN PRN
Status: DISCONTINUED | OUTPATIENT
Start: 2021-04-29 | End: 2021-04-29 | Stop reason: HOSPADM

## 2021-04-29 RX ORDER — CEPHALEXIN 500 MG/1
500 CAPSULE ORAL 3 TIMES DAILY
Qty: 10 CAPSULE | Refills: 0 | Status: SHIPPED | OUTPATIENT
Start: 2021-04-29 | End: 2021-05-03

## 2021-04-29 RX ORDER — HYDROCODONE BITARTRATE AND ACETAMINOPHEN 7.5; 325 MG/1; MG/1
1 TABLET ORAL EVERY 6 HOURS PRN
Qty: 28 TABLET | Refills: 0 | Status: SHIPPED | OUTPATIENT
Start: 2021-04-29 | End: 2021-05-05 | Stop reason: SDUPTHER

## 2021-04-29 RX ORDER — CEFAZOLIN SODIUM 2 G/50ML
2000 SOLUTION INTRAVENOUS ONCE
Status: DISCONTINUED | OUTPATIENT
Start: 2021-04-29 | End: 2021-04-29 | Stop reason: HOSPADM

## 2021-04-29 RX ORDER — PROPOFOL 10 MG/ML
INJECTION, EMULSION INTRAVENOUS PRN
Status: DISCONTINUED | OUTPATIENT
Start: 2021-04-29 | End: 2021-04-29 | Stop reason: SDUPTHER

## 2021-04-29 RX ORDER — HYDRALAZINE HYDROCHLORIDE 20 MG/ML
5 INJECTION INTRAMUSCULAR; INTRAVENOUS EVERY 10 MIN PRN
Status: DISCONTINUED | OUTPATIENT
Start: 2021-04-29 | End: 2021-04-29 | Stop reason: HOSPADM

## 2021-04-29 RX ORDER — PROMETHAZINE HYDROCHLORIDE 25 MG/ML
25 INJECTION, SOLUTION INTRAMUSCULAR; INTRAVENOUS
Status: DISCONTINUED | OUTPATIENT
Start: 2021-04-29 | End: 2021-04-29 | Stop reason: HOSPADM

## 2021-04-29 RX ORDER — FENTANYL CITRATE 50 UG/ML
50 INJECTION, SOLUTION INTRAMUSCULAR; INTRAVENOUS EVERY 5 MIN PRN
Status: DISCONTINUED | OUTPATIENT
Start: 2021-04-29 | End: 2021-04-29 | Stop reason: HOSPADM

## 2021-04-29 RX ORDER — NEOSTIGMINE METHYLSULFATE 1 MG/ML
INJECTION, SOLUTION INTRAVENOUS PRN
Status: DISCONTINUED | OUTPATIENT
Start: 2021-04-29 | End: 2021-04-29 | Stop reason: SDUPTHER

## 2021-04-29 RX ORDER — LIDOCAINE HYDROCHLORIDE 20 MG/ML
INJECTION, SOLUTION EPIDURAL; INFILTRATION; INTRACAUDAL; PERINEURAL PRN
Status: DISCONTINUED | OUTPATIENT
Start: 2021-04-29 | End: 2021-04-29 | Stop reason: SDUPTHER

## 2021-04-29 RX ORDER — EPHEDRINE SULFATE/0.9% NACL/PF 50 MG/5 ML
SYRINGE (ML) INTRAVENOUS PRN
Status: DISCONTINUED | OUTPATIENT
Start: 2021-04-29 | End: 2021-04-29 | Stop reason: SDUPTHER

## 2021-04-29 RX ORDER — FENTANYL CITRATE 50 UG/ML
INJECTION, SOLUTION INTRAMUSCULAR; INTRAVENOUS PRN
Status: DISCONTINUED | OUTPATIENT
Start: 2021-04-29 | End: 2021-04-29 | Stop reason: SDUPTHER

## 2021-04-29 RX ORDER — OXYCODONE HYDROCHLORIDE AND ACETAMINOPHEN 5; 325 MG/1; MG/1
2 TABLET ORAL EVERY 4 HOURS PRN
Status: DISCONTINUED | OUTPATIENT
Start: 2021-04-29 | End: 2021-04-29 | Stop reason: HOSPADM

## 2021-04-29 RX ORDER — MORPHINE SULFATE 2 MG/ML
1 INJECTION, SOLUTION INTRAMUSCULAR; INTRAVENOUS EVERY 5 MIN PRN
Status: DISCONTINUED | OUTPATIENT
Start: 2021-04-29 | End: 2021-04-29 | Stop reason: HOSPADM

## 2021-04-29 RX ORDER — MIDAZOLAM HYDROCHLORIDE 1 MG/ML
INJECTION INTRAMUSCULAR; INTRAVENOUS PRN
Status: DISCONTINUED | OUTPATIENT
Start: 2021-04-29 | End: 2021-04-29 | Stop reason: SDUPTHER

## 2021-04-29 RX ORDER — DEXAMETHASONE SODIUM PHOSPHATE 10 MG/ML
INJECTION, SOLUTION INTRAMUSCULAR; INTRAVENOUS PRN
Status: DISCONTINUED | OUTPATIENT
Start: 2021-04-29 | End: 2021-04-29 | Stop reason: SDUPTHER

## 2021-04-29 RX ORDER — MEPERIDINE HYDROCHLORIDE 25 MG/ML
12.5 INJECTION INTRAMUSCULAR; INTRAVENOUS; SUBCUTANEOUS EVERY 5 MIN PRN
Status: DISCONTINUED | OUTPATIENT
Start: 2021-04-29 | End: 2021-04-29 | Stop reason: HOSPADM

## 2021-04-29 RX ORDER — ROCURONIUM BROMIDE 10 MG/ML
INJECTION, SOLUTION INTRAVENOUS PRN
Status: DISCONTINUED | OUTPATIENT
Start: 2021-04-29 | End: 2021-04-29 | Stop reason: SDUPTHER

## 2021-04-29 RX ADMIN — Medication 0.6 MG: at 14:24

## 2021-04-29 RX ADMIN — SODIUM CHLORIDE, POTASSIUM CHLORIDE, SODIUM LACTATE AND CALCIUM CHLORIDE: 600; 310; 30; 20 INJECTION, SOLUTION INTRAVENOUS at 11:42

## 2021-04-29 RX ADMIN — SODIUM CHLORIDE, POTASSIUM CHLORIDE, SODIUM LACTATE AND CALCIUM CHLORIDE: 600; 310; 30; 20 INJECTION, SOLUTION INTRAVENOUS at 13:43

## 2021-04-29 RX ADMIN — ROCURONIUM BROMIDE 30 MG: 10 SOLUTION INTRAVENOUS at 12:51

## 2021-04-29 RX ADMIN — Medication 3 MG: at 14:24

## 2021-04-29 RX ADMIN — DEXAMETHASONE SODIUM PHOSPHATE 10 MG: 10 INJECTION, SOLUTION INTRAMUSCULAR; INTRAVENOUS at 12:51

## 2021-04-29 RX ADMIN — FENTANYL CITRATE 100 MCG: 50 INJECTION, SOLUTION INTRAMUSCULAR; INTRAVENOUS at 12:51

## 2021-04-29 RX ADMIN — ONDANSETRON 4 MG: 2 INJECTION INTRAMUSCULAR; INTRAVENOUS at 13:43

## 2021-04-29 RX ADMIN — SODIUM CHLORIDE, POTASSIUM CHLORIDE, SODIUM LACTATE AND CALCIUM CHLORIDE: 600; 310; 30; 20 INJECTION, SOLUTION INTRAVENOUS at 12:40

## 2021-04-29 RX ADMIN — Medication 10 MG: at 13:44

## 2021-04-29 RX ADMIN — FENTANYL CITRATE 50 MCG: 50 INJECTION, SOLUTION INTRAMUSCULAR; INTRAVENOUS at 14:24

## 2021-04-29 RX ADMIN — PROPOFOL 200 MG: 10 INJECTION, EMULSION INTRAVENOUS at 12:51

## 2021-04-29 RX ADMIN — FENTANYL CITRATE 50 MCG: 50 INJECTION, SOLUTION INTRAMUSCULAR; INTRAVENOUS at 13:05

## 2021-04-29 RX ADMIN — MIDAZOLAM 2 MG: 1 INJECTION INTRAMUSCULAR; INTRAVENOUS at 12:40

## 2021-04-29 RX ADMIN — LIDOCAINE HYDROCHLORIDE 3 ML: 20 INJECTION, SOLUTION EPIDURAL; INFILTRATION; INTRACAUDAL; PERINEURAL at 12:51

## 2021-04-29 ASSESSMENT — PULMONARY FUNCTION TESTS
PIF_VALUE: 22
PIF_VALUE: 22
PIF_VALUE: 21
PIF_VALUE: 22
PIF_VALUE: 18
PIF_VALUE: 22
PIF_VALUE: 24
PIF_VALUE: 22
PIF_VALUE: 22
PIF_VALUE: 19
PIF_VALUE: 22
PIF_VALUE: 22
PIF_VALUE: 19
PIF_VALUE: 22
PIF_VALUE: 22
PIF_VALUE: 24
PIF_VALUE: 2
PIF_VALUE: 22
PIF_VALUE: 7
PIF_VALUE: 22
PIF_VALUE: 19
PIF_VALUE: 22
PIF_VALUE: 23
PIF_VALUE: 3
PIF_VALUE: 23
PIF_VALUE: 23
PIF_VALUE: 22
PIF_VALUE: 23
PIF_VALUE: 24
PIF_VALUE: 23
PIF_VALUE: 24
PIF_VALUE: 22
PIF_VALUE: 19
PIF_VALUE: 5
PIF_VALUE: 24
PIF_VALUE: 22
PIF_VALUE: 2
PIF_VALUE: 23
PIF_VALUE: 3
PIF_VALUE: 23
PIF_VALUE: 23
PIF_VALUE: 22
PIF_VALUE: 25
PIF_VALUE: 2
PIF_VALUE: 23
PIF_VALUE: 2
PIF_VALUE: 23
PIF_VALUE: 3
PIF_VALUE: 22

## 2021-04-29 ASSESSMENT — PAIN SCALES - GENERAL
PAINLEVEL_OUTOF10: 0

## 2021-04-29 NOTE — ANESTHESIA POSTPROCEDURE EVALUATION
Department of Anesthesiology  Postprocedure Note    Patient: Roseanne Barreto  MRN: [id number]  YOB: 1960  Date of evaluation: 4/29/2021  Time:  3:09 PM     Procedure Summary     Date: 04/29/21 Room / Location: [address] [place] [address] 01 / [address] [facility]    Anesthesia Start: 9998 Anesthesia Stop: 0539    Procedure: RIGHT SHOULDER ARTHROSCOPY, ROTATOR CUFF REPAIR, SUBACROMIAL DECOMPRESSION AND DEBRIDEMENT (CPT 36775) TONY(ARTHREX)DEBRIDEMENT OF LABRUM , DEBRIDEMENT OF BICEPS (Right ) Diagnosis: (RIGHT SHOULDER ROTATOR CUFF TEAR, RIGHT SHOULDER IMPINGEMENT)    Surgeons: Ramos Barrett DO Responsible Provider: Joelle Arenas MD    Anesthesia Type: general, regional ASA Status: 3          Anesthesia Type: general, regional    Marlyn Phase I: Marlyn Score: 8    Marlyn Phase II: Marlyn Score: 10    Last vitals: Reviewed and per EMR flowsheets. Anesthesia Post Evaluation    Patient location during evaluation: PACU  Patient participation: waiting for patient participation  Level of consciousness: awake  Airway patency: patent  Nausea & Vomiting: no nausea and no vomiting  Complications: no  Cardiovascular status: blood pressure returned to baseline  Respiratory status: room air and spontaneous ventilation  Hydration status: stable  Comments: Good analgesia from block in PACU. Post block precautions given to pt and .

## 2021-04-29 NOTE — OP NOTE
Operative Note      Patient: Kylee Joseph  YOB: 1960  MRN: [id number]    Date of Procedure: 4/29/2021    Pre-Op Diagnosis: RIGHT SHOULDER ROTATOR CUFF TEAR, RIGHT SHOULDER IMPINGEMENT    Post-Op Diagnosis: Same       Procedure(s):  RIGHT SHOULDER ARTHROSCOPY, ROTATOR CUFF REPAIR, SUBACROMIAL DECOMPRESSION AND DEBRIDEMENT (CPT 61940) NOAH(ARTHREX)DEBRIDEMENT OF LABRUM , DEBRIDEMENT OF BICEPS, noah procedure    Surgeon(s): Miguelito Tobar DO    Assistant:   Surgical Assistant: Wili Best    Anesthesia: General    Estimated Blood Loss (mL): Minimal    Complications: None    Specimens:   * No specimens in log *    Implants:  Implant Name Type Inv. Item Serial No.  Lot No. LRB No. Used Action   ANCHOR SUT L19.1MM DIA4. 75MM BIOCOMPOSITE FULL THRD  ANCHOR SUT L19.1MM DIA4. 75MM BIOCOMPOSITE FULL THRD  [employer] [id number] Right 1 Implanted   ANCHOR SUTURE 4.75X19.1 MM TIG TAPE LOOP WHT BLK PUSHLOCK  ANCHOR SUTURE 4.75X19.1 MM TIG TAPE LOOP WHT BLK PUSHLOCK  [employer] [id number] Right 1 Implanted   ANCHOR SUT L24.5MM DIA4. 75MM BIOCOMPOSITE SELF PUNCHING  ANCHOR SUT L24.5MM DIA4. 75MM BIOCOMPOSITE SELF PUNCHING  [employer] [id number] Right 3 Implanted         Drains: * No LDAs found *    Findings: as above    Detailed Description of Procedure:   below        PREOPERATIVE DIAGNOSES: (1) right shoulder rotator cuff tear. (2)   Subacromial impingement syndrome . (3) labral/biceps tear (4) ac joint arthritis  POSTOPERATIVE DIAGNOSES: as above  OPERATION: (1)right shoulder arthroscopy with arthroscopic rotator cuff   repair. (2) Subacromial arch decompression. (3) debridement labrum/biceps (4) noah procedure  ANESTHESIA: General   Surgeon: zenaida   Assistant:general   ESTIMATED BLOOD LOSS: Minimal  COMPLICATIONS: None.    OPERATIVE IMPLANTS:5 swivel lock anchors    Brief Hospital Course: Kylee Joseph is a patient known to Saranya Vargas DO's practice with persistent complaints of shoulder pain. shoulder pain has failed to be relieved by non-operative conservative measures, and has began affecting daily activities of living. After examination of the patient, review of the radiologic studies, and appropriate pre-operative risk assessment, Francisco Mosqueda DO recommended shoulder arthroscopy, which the patient was agreeable towards. Operative Procedure:   I positioned the patient in the lateral decubitus position on a   beanbag, hung 10 pounds traction from the arm, prepped and draped the   arm in sterile fashion. I outlined an incision along the posterior, lateral, and anterior   acromion. I made an incision in the posterior side   of the shoulder, placed a blunt trocar within the glenohumeral joint and   carried out a diagnostic arthroscopy. Patient had a normal  glenohumeral   surface, glenoid and humeral head were in good. Patient didhave a tear involving the biceps, and did have a labral tear. The labrum and biceps were debrided. There was evidence of a full-thickness tear   from the undersurface looking up into the subacromial space. There was a partial thickness rotator cuff tear. I did debride the rotator cuff tendon. I then went to the subacromial space, I completed a complete subacromial   bursectomy removing all bursal tissue from the subacromial space. Using an [employer]   electrode, I outlined the acromial borders. I then carried out an anterior inferior   Acromioplasty, using a 5.0 barrel asha, smoothing the hooked acromion to a flat type 1 contour. The end of the distal clavicle was exposed using the RF. The lateral 8 mm of bone was excised using the 5-0 asha, noah. I then prepared the footprint of the greater tuberosity for implantation of the tendon down to nice bleeding bone. Tendon was debrided and cleaned up. The cuff tear measured about 3-4 cm in dimension and was full-thickness. I then choose to peform a double row repair.      I then went to

## 2021-04-29 NOTE — H&P
Updated H&P    Chief Complaint   Patient presents with    Shoulder Pain       Right shoulder pain for 6 weeks. Shoulder was injured when big dog jumped up on her.       Kiarra Martini is a 61y.o. year old   female who is seen today  for evaluation of right shoulder pain. She reports the pain has been ongoing for the past 6 weeks. She does recall a specific injury which started the pain. She reports the pain is worse with activity, better with rest.  The patient does have mechanical symptoms. Shedoes have night pain. She denies a feeling of instability. The prior treatments have been norco 5, ice ,and heat. The patient   has not responded to the treatment. The patient is right hand dominant. The patient is working. 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              Chief Complaint   Patient presents with    Shoulder Pain       Right shoulder pain for 6 weeks.  Shoulder was injured when big dog jumped up on her.       Past Medical History        Past Medical History:   Diagnosis Date    Anxiety      Chronic back pain      Hypothyroidism      Meniere's disease      Osteoarthritis      Unspecified sleep apnea           Past Surgical History         Past Surgical History:   Procedure Laterality Date     SECTION        CHOLECYSTECTOMY   2012 - Dr. Cheryl Fernandes, [facility], Laparoscopic     RIGHT COLECTOMY   2014     Giuliareesemali Mail - laparoscopic right colectomy     TAMMIE-EN-Y GASTRIC BYPASS   2010 - Laparoscopic    TUBAL LIGATION        UPPER GASTROINTESTINAL ENDOSCOPY               Current Medication      Current Outpatient Medications:     fluticasone (FLONASE) 50 MCG/ACT nasal spray, SHAKE LIQUID AND USE 1 SPRAY IN EACH NOSTRIL DAILY, Disp: , Rfl:     SPIRIVA RESPIMAT 1.25 MCG/ACT AERS inhaler, INHALE 2 PUFFS INTO THE LUNGS DAILY, Disp: , Rfl:     nabumetone (RELAFEN) 750 MG tablet, Take 1 tablet by mouth 2 times daily, Disp: 60 tablet, Rfl: 0    levothyroxine (SYNTHROID) 50 MCG file    Intimate partner violence       Fear of current or ex partner: Not on file       Emotionally abused: Not on file       Physically abused: Not on file       Forced sexual activity: Not on file   Other Topics Concern    Not on file   Social History Narrative    Not on file         Family History         Family History   Problem Relation Age of Onset    High Blood Pressure Mother      Heart Disease Mother      Obesity Mother      High Blood Pressure Father      High Blood Pressure Brother      Cancer Brother      Obesity Brother              REVIEW OF SYSTEMS:      General/Constitution:  (-)weight loss, (-)fever, (-)chills, (-)weakness. Skin: (-) rash,(-) psoriasis,(-) eczema, (-)skin cancer. Musculoskeletal: (-) fractures,  (-) dislocations,(-) collagen vascular disease, (-) fibromyalgia, (-) multiple sclerosis, (-) muscular dystrophy, (-) RSD,(-) joint pain (-)swelling, (-) joint pain,swelling. Neurologic: (-) epilepsy, (-)seizures,(-) brain tumor,(-) TIA, (-)stroke, (-)headaches, (-)Parkinson disease,(-) memory loss, (-) LOC. Cardiovascular: (-) Chest pain, (-) swelling in legs/feet, (-) SOB, (-) cramping in legs/feet with walking. Respiratory: (-) SOB, (-) Coughing, (-) night sweats. GI: (-) nausea, (-) vomiting, (-) diarrhea, (-) blood in stool, (-) gastric ulcer. Psychiatric: (-) Depression, (-) Anxiety, (-) bipolar disease, (-) Alzheimer's Disease  Allergic/Immunologic: (-) allergies latex, (-) allergies metal, (-) skin sensitivity.   Hematlogic: (-) anemia, (-) blood transfusion, (-) DVT/PE, (-) Clotting disorders        Subjective:    Vital signs are stable.  In general, patient is awake, alert and oriented X3, in no apparent distress.  Examination of HENT reveals normocephalic, atraumatic.  PERRLA/EOMI sclera are white.  Conjunctivae are clear.  TM's are intact.  Pharynx is pink and moist.  Uvula and tongue are midline.  Heart: Positive S1 and positive S2 with regular rate and rhythm.  Lungs: Clear to auscultation bilaterally without rales, rhonchi or wheezes.  Abdomen: soft, nontender.  Positive bowel sounds.  No organomegaly.  No guarding or rigidity.     Constitution:  BP (!) 159/93   Pulse 95   Temp 97 °F (36.1 °C) (Temporal)   Resp 18   Ht 5' 2.5\" (1.588 m)   Wt 204 lb (92.5 kg)   LMP 04/22/2007   SpO2 96%   BMI 36.72 kg/m²        Psycihatric:  The patient is alert and oriented x 3, appears to be stated age and in no distress.       Respiratory:  Respiratory effort is not labored. Patient is not gasping. Palpation of the chest reveals no tactile fremitus.     Skin:  Upon inspection: the skin appears warm, dry and intact. There is not a previous scar over the affected area. There is not any cellulitis, lymphedema or cutaneous lesions noted in the lower extremities. Upon palpation there is no induration noted.       Neurologic:  Motor exam of the upper extremities show: The reflexes in biceps/triceps/brachioradialis are equal and symmetric. Sensory exam C5-T1 are normal bilaterally.         Cardiovascular: The vascular exam is normal and is well perfused to distal extremities. There are 2+ radial pulses bilaterally, and motor and sensation is intact to median, ulnar, and radial, musclocutaneus, and axillary nerve distribution and grossly symmetric bilaterally. There is cap refill noted less than two seconds in all digits. There is not edema of the bilateral upper extremities. There is not varicosities noted in the distal extremities.       Lymph:  Upon palpation,  there is no lymphadenopathy noted in bilateral upper extremities.       Musculoskeletal:  Gait: normal; examination of the nails and digits reveal no cyanosis or clubbing.        Cervical Exam:  On physical exam, Corey Flood is well-developed, well-nourished, oriented to person, place and time.   her gait is normal.  On evaluation of hercervical spine, She has full range of motion of the cervical spine without pain.  There is no cervical tenderness to palpation.      Shoulder Exam:  On evaluation of her bilaterally upper extremities, her right shoulder has no deformity. There is tenderness upon palpation of the greater tuberosity and lateral shoulder. There is not evidence of scapular dyskinesis. There is not muscle atrophy in shoulder girdle. The range of motion for the Right Shoulder is 80/25/pl and for the Left shoulder is 150/45/t8. Right shoulder Motor strength is 4/5 in the supraspinatus, 5/5 internal rotation and 5-/5 in external rotation, and Left shoulder motor strength 5/5 in supraspinatus, 5/5 in internal rotation, 5/5 in external rotation.         Right shoulder:  positive Impingement , positive [place] ,negative  Speeds,negative  Apprehension ,negative Vasquez Load Shift, negative Sam manuver, negative Cross arm test.      Left shoulder:  negative Impingement , negative [place] ,negative  Speeds,negative  Apprehension ,negative Vasquez Load Shift, negative Sam manuver, negative Cross arm test.      XRAY:  n/a     MRI:    Impression   1. Retracted full-thickness tearing of mid and posterior supraspinatus and   entirety of infraspinatus with retraction of the torn fibers from the   footplate measuring up to 2.9 cm.   2. Moderate atrophy and fatty degeneration of infraspinatus. 3. Mild edema in the infraspinatus and posterior supraspinatus muscle belly. 4. Mild tendinosis of the intra-articular long head of the biceps tendon. 5. Tearing of the posterior labrum.  Mild underlying diffuse labral   degeneration. 6. Mild glenohumeral chondromalacia. 7. Mild degenerative change of the right AC joint.            Radiographic findings reviewed with patient     Impression:        Encounter Diagnoses   Name Primary?  Shoulder impingement, right Yes    Complete tear of right rotator cuff, unspecified whether traumatic           Plan: Natural history and expected course discussed.  Questions answered. Educational material distributed. Reduction in offending activity. Gentle ROM exercises  RICE therapy. I had a lengthy discussion with the patient regarding their diagnosis. I explained treatment options including surgical vs non surgical treatment. I reviewed in detail the risks and benefits and outlined the procedure in detail with expected outcomes and possible complications. I also discussed non surgical treatment such as injections (CSI and visco supplementation), physical therapy, topical creams and NSAID's. They have elected for surgical management at this time. Patient will undergo Right shoulder arthroscopy with rotator cuff repair, debridement and subacromial decompression 4/29/2021  The risks and benefits were reviewed with the patient such as: arthorfibrosis shoulder, DVT, infection,  injuries to blood vessels and nerves, non relief of symptoms, recurrent tear, continued pain, worsening of symptoms. The patient was counseled at length about the risks of adriana Covid-19 during their perioperative period and any recovery window from their procedure.  The patient was made aware that adriana Covid-19  may worsen their prognosis for recovering from their procedure  and lend to a higher morbidity and/or mortality risk.  All material risks, benefits, and reasonable alternatives including postponing the procedure were discussed.  The patient does wish to proceed with the procedure at this time.

## 2021-04-29 NOTE — ANESTHESIA PROCEDURE NOTES
Peripheral Block    Patient location during procedure: OR  Start time: 4/29/2021 12:41 PM  End time: 4/29/2021 12:48 PM  Staffing  Performed: anesthesiologist   Anesthesiologist: Dileep Carrero MD  Preanesthetic Checklist  Completed: patient identified, IV checked, site marked, risks and benefits discussed, surgical consent, monitors and equipment checked, pre-op evaluation, timeout performed, anesthesia consent given, oxygen available and patient being monitored  Peripheral Block  Patient position: supine  Prep: ChloraPrep  Patient monitoring: cardiac monitor, continuous pulse ox, continuous capnometry, frequent blood pressure checks and IV access  Block type: Brachial plexus  Laterality: right  Injection technique: single-shot  Guidance: nerve stimulator  Local infiltration: lidocaine  Infiltration strength: 2 %  Dose: 2 mL  Interscalene  Provider prep: mask and sterile gloves  Local infiltration: lidocaine  Needle  Needle type: combined needle/nerve stimulator   Needle gauge: 22 G  Needle length: 5 cm  Needle localization: nerve stimulator  Needle insertion depth: 1.5 cm  Test dose: negative  Assessment  Injection assessment: negative aspiration for heme  Paresthesia pain: immediately resolved  Slow fractionated injection: yes  Hemodynamics: stable  Additional Notes  Ropivacaine 05% 25cc  Reason for block: post-op pain management

## 2021-05-05 ENCOUNTER — TELEPHONE (OUTPATIENT)
Dept: ORTHOPEDIC SURGERY | Age: 61
End: 2021-05-05

## 2021-05-05 DIAGNOSIS — M75.41 SHOULDER IMPINGEMENT, RIGHT: ICD-10-CM

## 2021-05-05 DIAGNOSIS — S46.011A TRAUMATIC COMPLETE TEAR OF RIGHT ROTATOR CUFF, INITIAL ENCOUNTER: ICD-10-CM

## 2021-05-05 RX ORDER — HYDROCODONE BITARTRATE AND ACETAMINOPHEN 7.5; 325 MG/1; MG/1
1 TABLET ORAL EVERY 6 HOURS PRN
Qty: 28 TABLET | Refills: 0 | Status: SHIPPED
Start: 2021-05-05 | End: 2021-05-12 | Stop reason: SDUPTHER

## 2021-05-05 NOTE — TELEPHONE ENCOUNTER
Last appointment Surgery on 4/29/2021  Next appointment   Future Appointments   Date Time Provider Jenny Jenseni   5/13/2021  9:45 AM MISAEL Katz - ANGELINE [facility]   6/11/2021 11:30 AM Jerry Jorgensen MD [place] [facility]      Last refill 4/29/2021  DOS: 4/29/2021       Patient called in requesting refill of HYDROcodone-acetaminophen (Shruthi Nickerson) 7.5-325 MG per tablet   To  [facility],  Nilda Ramos [address] - P [phone] - F [phone]

## 2021-05-11 DIAGNOSIS — S46.011A TRAUMATIC COMPLETE TEAR OF RIGHT ROTATOR CUFF, INITIAL ENCOUNTER: ICD-10-CM

## 2021-05-11 DIAGNOSIS — M75.41 SHOULDER IMPINGEMENT, RIGHT: ICD-10-CM

## 2021-05-11 DIAGNOSIS — M75.121 COMPLETE TEAR OF RIGHT ROTATOR CUFF, UNSPECIFIED WHETHER TRAUMATIC: Primary | ICD-10-CM

## 2021-05-12 ENCOUNTER — TELEPHONE (OUTPATIENT)
Dept: ORTHOPEDIC SURGERY | Age: 61
End: 2021-05-12

## 2021-05-12 DIAGNOSIS — S46.011A TRAUMATIC COMPLETE TEAR OF RIGHT ROTATOR CUFF, INITIAL ENCOUNTER: ICD-10-CM

## 2021-05-12 DIAGNOSIS — M75.41 SHOULDER IMPINGEMENT, RIGHT: ICD-10-CM

## 2021-05-12 RX ORDER — HYDROCODONE BITARTRATE AND ACETAMINOPHEN 7.5; 325 MG/1; MG/1
1 TABLET ORAL EVERY 6 HOURS PRN
Qty: 28 TABLET | Refills: 0 | Status: SHIPPED
Start: 2021-05-12 | End: 2021-05-19 | Stop reason: SDUPTHER

## 2021-05-12 NOTE — TELEPHONE ENCOUNTER
.  Last appointment 4/13/2021  Next appointment   Future Appointments   Date Time Provider Jenny Rodgers   5/13/2021  9:30 AM [address]   5/13/2021  9:45 AM [facility]   6/11/2021 11:30 AM Suresh Puga MD [place] [facility]      Last refill:  05/05/2021  DOS: 04/29/2021      Patient called in requesting refill of:    HYDROcodone-acetaminophen ([facility]) 7.5-325 MG per tablet     [id number] - Jimmey Fine - [address] - P [phone] - F [phone]   [address] [place] [address], [address] [place] [phone]

## 2021-05-13 ENCOUNTER — OFFICE VISIT (OUTPATIENT)
Dept: ORTHOPEDIC SURGERY | Age: 61
End: 2021-05-13

## 2021-05-13 VITALS — BODY MASS INDEX: 36.14 KG/M2 | TEMPERATURE: 98 F | HEIGHT: 63 IN | WEIGHT: 204 LBS

## 2021-05-13 DIAGNOSIS — M19.011 ARTHRITIS OF RIGHT ACROMIOCLAVICULAR JOINT: ICD-10-CM

## 2021-05-13 DIAGNOSIS — S46.011A TRAUMATIC COMPLETE TEAR OF RIGHT ROTATOR CUFF, INITIAL ENCOUNTER: ICD-10-CM

## 2021-05-13 DIAGNOSIS — S46.111A LABRAL TEAR OF LONG HEAD OF RIGHT BICEPS TENDON, INITIAL ENCOUNTER: ICD-10-CM

## 2021-05-13 DIAGNOSIS — M75.41 SHOULDER IMPINGEMENT, RIGHT: Primary | ICD-10-CM

## 2021-05-13 PROCEDURE — 99024 POSTOP FOLLOW-UP VISIT: CPT | Performed by: NURSE PRACTITIONER

## 2021-05-13 RX ORDER — IBUPROFEN 800 MG/1
TABLET ORAL
COMMUNITY
Start: 2021-04-20 | End: 2021-12-10

## 2021-05-13 NOTE — PROGRESS NOTES
Luisana Rush is here for follow-up after (1)right shoulder arthroscopy with arthroscopic rotator cuff   repair. (2) Subacromial arch decompression. (3) debridement labrum/biceps (4) noah procedure  Pain is controlled with current analgesics. Medication(s) being used: norco.  The patient denies fever, wound drainage, increasing redness, pus, increasing pain, increasing swelling. Post op problems reported: none. She is ambulating with sling. Physical exam:  The wounds are clean, dry, no drainage. She has intact motor and sensory functions, grossly intact in the median, ulnar, radial, musculocutaneous, and axillary nerve distributions. She   has bounding pulses in the wrist.  Capillary refill is less than 2 seconds in all digits. Surgical pictures from the surgery were reveiwed with the patient     Impression:   Encounter Diagnoses   Name Primary?  Shoulder impingement, right Yes    Traumatic complete tear of right rotator cuff, initial encounter     Arthritis of right acromioclavicular joint     Labral tear of long head of right biceps tendon, initial encounter          Plan:    I will remove the surgical sutures. The patient will now D/C the abduction pillow. She  will begin range of motion at the elbow, wrist and hand. She will continue to use analgesics to control the pain and will continue with sling immobilization. I will see them back in 4 weeks for repeat evaluation.

## 2021-05-17 RX ORDER — FLUTICASONE PROPIONATE 50 MCG
SPRAY, SUSPENSION (ML) NASAL
Qty: 16 G | Refills: 2 | Status: SHIPPED
Start: 2021-05-17 | End: 2021-10-08 | Stop reason: CLARIF

## 2021-05-19 ENCOUNTER — TELEPHONE (OUTPATIENT)
Dept: ORTHOPEDIC SURGERY | Age: 61
End: 2021-05-19

## 2021-05-19 DIAGNOSIS — M75.41 SHOULDER IMPINGEMENT, RIGHT: ICD-10-CM

## 2021-05-19 DIAGNOSIS — S46.011A TRAUMATIC COMPLETE TEAR OF RIGHT ROTATOR CUFF, INITIAL ENCOUNTER: ICD-10-CM

## 2021-05-19 RX ORDER — HYDROCODONE BITARTRATE AND ACETAMINOPHEN 7.5; 325 MG/1; MG/1
1 TABLET ORAL EVERY 6 HOURS PRN
Qty: 28 TABLET | Refills: 0 | Status: SHIPPED
Start: 2021-05-19 | End: 2021-05-26 | Stop reason: SDUPTHER

## 2021-05-20 PROBLEM — Z01.818 PREOP EXAMINATION: Status: RESOLVED | Noted: 2021-04-20 | Resolved: 2021-05-20

## 2021-05-20 PROBLEM — Z12.11 SCREENING FOR COLON CANCER: Status: RESOLVED | Noted: 2021-04-20 | Resolved: 2021-05-20

## 2021-05-26 ENCOUNTER — TELEPHONE (OUTPATIENT)
Dept: ORTHOPEDIC SURGERY | Age: 61
End: 2021-05-26

## 2021-05-26 DIAGNOSIS — S46.011A TRAUMATIC COMPLETE TEAR OF RIGHT ROTATOR CUFF, INITIAL ENCOUNTER: ICD-10-CM

## 2021-05-26 DIAGNOSIS — M75.41 SHOULDER IMPINGEMENT, RIGHT: ICD-10-CM

## 2021-05-26 RX ORDER — HYDROCODONE BITARTRATE AND ACETAMINOPHEN 7.5; 325 MG/1; MG/1
1 TABLET ORAL EVERY 6 HOURS PRN
Qty: 28 TABLET | Refills: 0 | Status: SHIPPED
Start: 2021-05-26 | End: 2021-06-02 | Stop reason: SDUPTHER

## 2021-05-26 NOTE — TELEPHONE ENCOUNTER
Last appointment Visit 05/13/2021  Next appointment          Future Appointments   Date Time Provider Jenny Rodgers   6/11/2021 11:30 AM Blair Keating MD [facility]   6/17/2021  9:45 AM MISAEL Frias - ANGELINE [facility]      Last refill 05/19/2021  DOS:  04/29/2021        Patient called in requesting refill of   [place] 7.5mg  [facility] [address] [place] [address] [address] - P [place] & [address], [address] [place] [id number]   Phone:  [phone]  Fax:  [phone]

## 2021-05-27 DIAGNOSIS — E03.9 ACQUIRED HYPOTHYROIDISM: ICD-10-CM

## 2021-05-27 RX ORDER — LEVOTHYROXINE SODIUM 0.05 MG/1
50 TABLET ORAL DAILY
Qty: 90 TABLET | Refills: 1 | Status: SHIPPED
Start: 2021-05-27 | End: 2021-11-24

## 2021-06-02 ENCOUNTER — TELEPHONE (OUTPATIENT)
Dept: ORTHOPEDIC SURGERY | Age: 61
End: 2021-06-02

## 2021-06-02 DIAGNOSIS — M75.41 SHOULDER IMPINGEMENT, RIGHT: ICD-10-CM

## 2021-06-02 DIAGNOSIS — S46.011A TRAUMATIC COMPLETE TEAR OF RIGHT ROTATOR CUFF, INITIAL ENCOUNTER: ICD-10-CM

## 2021-06-02 RX ORDER — HYDROCODONE BITARTRATE AND ACETAMINOPHEN 7.5; 325 MG/1; MG/1
1 TABLET ORAL EVERY 8 HOURS PRN
Qty: 21 TABLET | Refills: 0 | Status: SHIPPED
Start: 2021-06-02 | End: 2021-06-09 | Stop reason: SDUPTHER

## 2021-06-02 NOTE — TELEPHONE ENCOUNTER
.  Last appointment 4/13/2021  Next appointment   Future Appointments   Date Time Provider Jenny Rodgers   6/11/2021 11:30 AM Jm Mijares MD [facility]   6/17/2021  9:45 AM [facility]ISA[facility]      Last refill:  05/26/2021  DOS: 04/29/2021      Patient called in requesting refill of:    HYDROcodone-acetaminophen ([facility]) 7.5-325 MG per tablet       [id number] - [facility] - [address] - P [phone] - F [phone]   [address] [place], [place] [id number]

## 2021-06-04 ENCOUNTER — VIRTUAL VISIT (OUTPATIENT)
Dept: FAMILY MEDICINE CLINIC | Age: 61
End: 2021-06-04
Payer: COMMERCIAL

## 2021-06-04 DIAGNOSIS — R42 VERTIGO: ICD-10-CM

## 2021-06-04 DIAGNOSIS — J01.90 ACUTE BACTERIAL SINUSITIS: Primary | ICD-10-CM

## 2021-06-04 DIAGNOSIS — H81.02 MENIERE'S DISEASE OF LEFT EAR: ICD-10-CM

## 2021-06-04 DIAGNOSIS — B96.89 ACUTE BACTERIAL SINUSITIS: Primary | ICD-10-CM

## 2021-06-04 PROCEDURE — G8417 CALC BMI ABV UP PARAM F/U: HCPCS | Performed by: FAMILY MEDICINE

## 2021-06-04 PROCEDURE — 3017F COLORECTAL CA SCREEN DOC REV: CPT | Performed by: FAMILY MEDICINE

## 2021-06-04 PROCEDURE — 99213 OFFICE O/P EST LOW 20 MIN: CPT | Performed by: FAMILY MEDICINE

## 2021-06-04 PROCEDURE — G8427 DOCREV CUR MEDS BY ELIG CLIN: HCPCS | Performed by: FAMILY MEDICINE

## 2021-06-04 PROCEDURE — 4004F PT TOBACCO SCREEN RCVD TLK: CPT | Performed by: FAMILY MEDICINE

## 2021-06-04 RX ORDER — CEFDINIR 300 MG/1
300 CAPSULE ORAL 2 TIMES DAILY
Qty: 14 CAPSULE | Refills: 0 | Status: SHIPPED | OUTPATIENT
Start: 2021-06-04 | End: 2021-06-11

## 2021-06-04 RX ORDER — GUAIFENESIN 600 MG/1
600 TABLET, EXTENDED RELEASE ORAL 2 TIMES DAILY
Qty: 30 TABLET | Refills: 0 | Status: SHIPPED | OUTPATIENT
Start: 2021-06-04 | End: 2021-06-19

## 2021-06-09 DIAGNOSIS — S46.011A TRAUMATIC COMPLETE TEAR OF RIGHT ROTATOR CUFF, INITIAL ENCOUNTER: ICD-10-CM

## 2021-06-09 DIAGNOSIS — M75.41 SHOULDER IMPINGEMENT, RIGHT: ICD-10-CM

## 2021-06-09 RX ORDER — HYDROCODONE BITARTRATE AND ACETAMINOPHEN 7.5; 325 MG/1; MG/1
1 TABLET ORAL EVERY 8 HOURS PRN
Qty: 21 TABLET | Refills: 0 | Status: SHIPPED
Start: 2021-06-09 | End: 2021-06-16 | Stop reason: SDUPTHER

## 2021-06-10 PROBLEM — B96.89 ACUTE BACTERIAL SINUSITIS: Status: ACTIVE | Noted: 2021-06-10

## 2021-06-10 PROBLEM — J01.90 ACUTE BACTERIAL SINUSITIS: Status: ACTIVE | Noted: 2021-06-10

## 2021-06-10 PROBLEM — R42 VERTIGO: Status: ACTIVE | Noted: 2021-06-10

## 2021-06-10 ASSESSMENT — ENCOUNTER SYMPTOMS
WHEEZING: 0
TROUBLE SWALLOWING: 0
STRIDOR: 0
FACIAL SWELLING: 0
ANAL BLEEDING: 0
ALLERGIC/IMMUNOLOGIC NEGATIVE: 1
EYE ITCHING: 0
EYE DISCHARGE: 0
VOMITING: 0
EYE PAIN: 0
SORE THROAT: 0
DIARRHEA: 0
BLOOD IN STOOL: 0
RHINORRHEA: 0
SINUS PAIN: 1
COUGH: 1
SHORTNESS OF BREATH: 0
ABDOMINAL DISTENTION: 0
COLOR CHANGE: 0
CHEST TIGHTNESS: 0
EYES NEGATIVE: 1
EYE REDNESS: 0
PHOTOPHOBIA: 0
CONSTIPATION: 0
ABDOMINAL PAIN: 0
SINUS PRESSURE: 1
CHOKING: 0
BACK PAIN: 0
VOICE CHANGE: 0
RECTAL PAIN: 0
NAUSEA: 0

## 2021-06-11 NOTE — PROGRESS NOTES
Albaro Cross is a 61 y.o. female. HPI/Chief C/O:  Chief Complaint   Patient presents with    Sinus Problem     c/o stuffy nose; sunis drainage;  cough-worse in the evening, productive. Denies fever or sob. Patient had Covid 12/2020.  Other     patient consents to telemedicine visit and is at home in [facility]     Allergies   Allergen Reactions    Meloxicam      Severe stomach pain    Tramadol Other (See Comments)     Makes blisters in mouth     This 61year old female presents with nasal congestion sinus pressure and cough for 1 week. Pt denies fever, denies nausea and vomiting, denies shortness of breath, and denies chest pain. Pt has covid in 12/2020 and has not fully regained taste and smell. Pt has meniers disease. Pt has rotator cuff surgery on 4/29/2021. TeleMedicine Video Visit    Naomie Arevalo, was evaluated through a synchronous (real-time) audio-video encounter. The patient (or guardian if applicable) is aware that this is a billable service. Verbal consent to proceed has been obtained within the past 12 months. The visit was conducted pursuant to the emergency declaration under the [address] [place] [address], [address] [place] [address] authority and the [employer] and [employer] General Act. Patient identification was verified, and a caregiver was present when appropriate. The patient was located in a state where the provider was credentialed to provide care. Patient identification was verified at the start of the visit, including the patient's telephone number and physical location. I discussed with the patient the nature of our telehealth visits, that:     1. Due to the nature of an audio- video modality, the only components of a physical exam that could be done are the elements supported by direct observation. 2. I would evaluate the patient and recommend diagnostics and treatments based on my assessment.   3. If it was felt that the patient should be evaluated in clinic or an emergency room setting, then they would be directed there. 4. Our sessions are not being recorded and that personal health information is protected. 5. Our team would provide follow up care in person if/when the patient needs it. Patient's location: home address in [facility]  Physician  location other address in [facility] other people involved in call  Dr. Lovie Severin       On this date 6/4/2021 I have spent 25 minutes reviewing previous notes, test results and face to face (virtual) with the patient discussing the diagnosis and importance of compliance with the treatment plan as well as documenting on the day of the visit. This visit was completed virtually using Doxy. me      ROS:  Review of Systems   Constitutional: Positive for fatigue. Negative for activity change, appetite change, chills, diaphoresis, fever and unexpected weight change. HENT: Positive for congestion, sinus pressure and sinus pain. Negative for dental problem, drooling, ear discharge, ear pain, facial swelling, hearing loss, mouth sores, nosebleeds, postnasal drip, rhinorrhea, sneezing, sore throat, tinnitus, trouble swallowing and voice change. Eyes: Negative. Negative for photophobia, pain, discharge, redness, itching and visual disturbance. Respiratory: Positive for cough. Negative for choking, chest tightness, shortness of breath, wheezing and stridor. Cardiovascular: Negative. Negative for chest pain, palpitations and leg swelling. Gastrointestinal: Negative for abdominal distention, abdominal pain, anal bleeding, blood in stool, constipation, diarrhea, nausea, rectal pain and vomiting. Endocrine: Negative. Negative for cold intolerance, heat intolerance, polydipsia, polyphagia and polyuria. Genitourinary: Negative.   Negative for decreased urine volume, difficulty urinating, dysuria, flank pain, frequency, genital sores, hematuria, menstrual problem, pelvic pain and urgency. Musculoskeletal: Positive for arthralgias and myalgias. Negative for back pain, gait problem, joint swelling, neck pain and neck stiffness. Skin: Negative. Negative for color change, pallor, rash and wound. Allergic/Immunologic: Negative. Neurological: Positive for dizziness. Negative for tremors, seizures, syncope, facial asymmetry, speech difficulty, weakness, light-headedness, numbness and headaches. Hematological: Negative. Negative for adenopathy. Does not bruise/bleed easily. Psychiatric/Behavioral: Negative. Negative for agitation, behavioral problems, confusion, decreased concentration, dysphoric mood, hallucinations, self-injury, sleep disturbance and suicidal ideas. The patient is not nervous/anxious and is not hyperactive.          Past Medical/Surgical Hx;  Reviewed with patient      Diagnosis Date    Anxiety     Chronic back pain     Hypertension     Hypothyroidism     Meniere's disease     Osteoarthritis     Unspecified sleep apnea     no c-pap     Past Surgical History:   Procedure Laterality Date     SECTION      CHOLECYSTECTOMY  2012 - Dr. Cecilio Justice, [facility], Laparoscopic     RIGHT COLECTOMY  2014    Kelly Gray - laparoscopic right colectomy \" purpose\": intestines flipped\"    TAMMIE-EN-Y GASTRIC BYPASS  2010 - Laparoscopic    SHOULDER ARTHROSCOPY Right 2021    RIGHT SHOULDER ARTHROSCOPY, ROTATOR CUFF REPAIR, SUBACROMIAL DECOMPRESSION AND DEBRIDEMENT (CPT 86937) TONY(ARTHREX)DEBRIDEMENT OF LABRUM , DEBRIDEMENT OF BICEPS performed by Miguelito Tobar DO at [address] [facility] Right 2021    5 ANCHORS    TUBAL LIGATION      UPPER GASTROINTESTINAL ENDOSCOPY         Past Family Hx:  Reviewed with patient      Problem Relation Age of Onset    High Blood Pressure Mother     Heart Disease Mother     Obesity Mother     High Blood Pressure Father     High Blood Pressure Brother     Cancer Brother     Obesity Brother Social Hx:  Reviewed with patient  Social History     Tobacco Use    Smoking status: Current Every Day Smoker     Packs/day: 1.00     Years: 7.00     Pack years: 7.00     Types: Cigarettes     Start date: 1/1/2012    Smokeless tobacco: Never Used    Tobacco comment: Previously smoked for 8 years, then quit for 15 years, then started again   Substance Use Topics    Alcohol use: No       OBJECTIVE  LMP  (LMP Unknown)     Problem List:  Sandrita Slade does not have any pertinent problems on file. PHYS EX:  Pt has nasal congestion and cough    ASSESSMENT/PLAN  Sandrita Slade was seen today for sinus problem and other. Diagnoses and all orders for this visit:    Acute bacterial sinusitis  -     cefdinir (OMNICEF) 300 MG capsule; Take 1 capsule by mouth 2 times daily for 7 days  -     guaiFENesin (MUCINEX) 600 MG extended release tablet; Take 1 tablet by mouth 2 times daily for 15 days  Not controlled. Meniere's disease of left ear  -     cefdinir (OMNICEF) 300 MG capsule; Take 1 capsule by mouth 2 times daily for 7 days  -     guaiFENesin (MUCINEX) 600 MG extended release tablet; Take 1 tablet by mouth 2 times daily for 15 days  Stable. Vertigo  Stable. Antivert. Pt instructed if any worse go ED ASAP. Outpatient Encounter Medications as of 6/4/2021   Medication Sig Dispense Refill    cefdinir (OMNICEF) 300 MG capsule Take 1 capsule by mouth 2 times daily for 7 days 14 capsule 0    guaiFENesin (MUCINEX) 600 MG extended release tablet Take 1 tablet by mouth 2 times daily for 15 days 30 tablet 0    [DISCONTINUED] HYDROcodone-acetaminophen (NORCO) 7.5-325 MG per tablet Take 1 tablet by mouth every 8 hours as needed for Pain for up to 7 days. Intended supply: 7 days.  Take lowest dose possible to manage pain 21 tablet 0    levothyroxine (SYNTHROID) 50 MCG tablet TAKE 1 TABLET BY MOUTH DAILY 90 tablet 1    fluticasone (FLONASE) 50 MCG/ACT nasal spray SHAKE LIQUID AND USE 1 SPRAY IN EACH NOSTRIL DAILY 16 g 2    ibuprofen (ADVIL;MOTRIN) 800 MG tablet TAKE 1 TABLET BY MOUTH THREE TIMES DAILY WITH MEALS AS NEEDED FOR PAIN      hydroCHLOROthiazide (HYDRODIURIL) 25 MG tablet Take 1 tablet by mouth every morning 30 tablet 5    calcium carbonate (OSCAL) 500 MG TABS tablet Take 1,000 mg by mouth daily      ferrous sulfate 325 (65 Fe) MG tablet Take 325 mg by mouth daily (with breakfast)      Multiple Vitamin (MULTIVITAMIN PO) Take 1 tablet by mouth 2 times daily. Ultra Rodrick        No facility-administered encounter medications on file as of 6/4/2021. No follow-ups on file.         Reviewed recent labs related to Dolores's current problems      Discussed importance of regular Health Maintenance follow up  Health Maintenance   Topic    Hepatitis C screen     Pneumococcal 0-64 years Vaccine (1 of 2 - PPSV23)    HIV screen     DTaP/Tdap/Td vaccine (1 - Tdap)    Cervical cancer screen     Shingles Vaccine (1 of 2)    COVID-19 Vaccine (2 - Moderna 2-dose series)    Flu vaccine (Season Ended)    A1C test (Diabetic or Prediabetic)     TSH testing     Potassium monitoring     Creatinine monitoring     Breast cancer screen     Lipid screen     Colon cancer screen colonoscopy     Hepatitis A vaccine     Hepatitis B vaccine     Hib vaccine     Meningococcal (ACWY) vaccine

## 2021-06-16 DIAGNOSIS — S46.011A TRAUMATIC COMPLETE TEAR OF RIGHT ROTATOR CUFF, INITIAL ENCOUNTER: ICD-10-CM

## 2021-06-16 DIAGNOSIS — M75.41 SHOULDER IMPINGEMENT, RIGHT: ICD-10-CM

## 2021-06-16 RX ORDER — HYDROCODONE BITARTRATE AND ACETAMINOPHEN 7.5; 325 MG/1; MG/1
1 TABLET ORAL EVERY 8 HOURS PRN
Qty: 21 TABLET | Refills: 0 | Status: SHIPPED
Start: 2021-06-16 | End: 2021-06-23 | Stop reason: SDUPTHER

## 2021-06-16 NOTE — TELEPHONE ENCOUNTER
Refills have been requested for the following medications:         HYDROcodone-acetaminophen (NORCO) 7.5-325 MG per tablet Dilcia East, APRN - CNP]     Preferred pharmacy: [address]      Last office visit 5/13/2021, DOS 4/29/2021

## 2021-06-17 ENCOUNTER — OFFICE VISIT (OUTPATIENT)
Dept: ORTHOPEDIC SURGERY | Age: 61
End: 2021-06-17

## 2021-06-17 VITALS — BODY MASS INDEX: 37.03 KG/M2 | TEMPERATURE: 98 F | HEIGHT: 63 IN | WEIGHT: 209 LBS

## 2021-06-17 DIAGNOSIS — M75.41 SHOULDER IMPINGEMENT, RIGHT: Primary | ICD-10-CM

## 2021-06-17 DIAGNOSIS — M19.011 ARTHRITIS OF RIGHT ACROMIOCLAVICULAR JOINT: ICD-10-CM

## 2021-06-17 DIAGNOSIS — S46.111A LABRAL TEAR OF LONG HEAD OF RIGHT BICEPS TENDON, INITIAL ENCOUNTER: ICD-10-CM

## 2021-06-17 DIAGNOSIS — S46.011A TRAUMATIC COMPLETE TEAR OF RIGHT ROTATOR CUFF, INITIAL ENCOUNTER: ICD-10-CM

## 2021-06-17 PROCEDURE — 99024 POSTOP FOLLOW-UP VISIT: CPT | Performed by: NURSE PRACTITIONER

## 2021-06-17 NOTE — PROGRESS NOTES
Daysi Shipman is here for post op visit after RTC repair and shoulder arthroscopy. The patient is post op week 6. The patient is  doing well. Physical exam:  The wounds are clean, dry, no drainage. Range of motion: diminished range of motion. She has intact motor and sensory functions, grossly intact in the median, ulnar, radial, musculocutaneous, and axillary nerve distributions. She has bounding pulses in the wrist.  Capillary refill is less than 2 seconds in all digits. Impression:  {  Encounter Diagnoses   Name Primary?  Shoulder impingement, right Yes    Traumatic complete tear of right rotator cuff, initial encounter     Arthritis of right acromioclavicular joint     Labral tear of long head of right biceps tendon, initial encounter        Plan:    The patient will now D/C the sling. She will continue range of motion at the elbow, wrist and hand and initiate physical therapy. She will continue to use analgesics to control the pain.       I will see them back in 2 months

## 2021-06-21 ENCOUNTER — EVALUATION (OUTPATIENT)
Dept: PHYSICAL THERAPY | Age: 61
End: 2021-06-21
Payer: COMMERCIAL

## 2021-06-21 DIAGNOSIS — S46.111A LABRAL TEAR OF LONG HEAD OF RIGHT BICEPS TENDON, INITIAL ENCOUNTER: ICD-10-CM

## 2021-06-21 DIAGNOSIS — M75.41 SHOULDER IMPINGEMENT, RIGHT: Primary | ICD-10-CM

## 2021-06-21 DIAGNOSIS — M19.011 ARTHRITIS OF RIGHT ACROMIOCLAVICULAR JOINT: ICD-10-CM

## 2021-06-21 DIAGNOSIS — S46.011A TRAUMATIC COMPLETE TEAR OF RIGHT ROTATOR CUFF, INITIAL ENCOUNTER: ICD-10-CM

## 2021-06-21 PROCEDURE — 97110 THERAPEUTIC EXERCISES: CPT | Performed by: PHYSICAL THERAPIST

## 2021-06-21 PROCEDURE — 97162 PT EVAL MOD COMPLEX 30 MIN: CPT | Performed by: PHYSICAL THERAPIST

## 2021-06-21 NOTE — PROGRESS NOTES
Physical Therapy Daily Treatment Note    Date: 2021  Patient Name: Shala Mckenzie  : 1960   MRN: [id number]  DOInjury: ~2/15/21  DOSx: 21   Referring Provider: MISAEL Garcia - [facility] [address] [place] [address]     Medical Diagnosis:      Diagnosis Orders   1. Shoulder impingement, right     2. Traumatic complete tear of right rotator cuff, initial encounter     3. Arthritis of right acromioclavicular joint     4. Labral tear of long head of right biceps tendon, initial encounter         Outcome Measure:   QuickDASH (Disorders of the Arm, Shoulder, and Hand) 80% disability    Access Code: [email or web address]  URL: [email or web address]  Date: 2021  Prepared by: Malu Nguyen    Exercises  Circular Shoulder Pendulum with Table Support - 2 x daily - 1 sets - 30 reps  Supine Shoulder Press - 2 x daily - 3 sets - 10 reps  Standing Shoulder External Rotation AAROM with Dowel - 2 x daily - 3 sets - 10 reps  Standing Shoulder Internal Rotation AAROM with Dowel - 2 x daily - 3 sets - 10 reps    12 weeks post-op: 2021      S: See janice  O:  Time 0162-7297     Visit  Repeat outcome measure at mid point and end. Pain Pain 8/10     ROM 90° Forward elevation,  40° ER,  IR to L4     Modalities       Use sparingly if at all. Prefer an active program.  MO   Manual      Stretching all directions. Please monitor ROM. Add stretching as needed. MT         Stretch      Table slides Next   TE   Wall Flexion   TE   Wall ER stretch   TE   Towel IR stretch   TE   IR reaching behind back   TE   Exercise      Pendulum Ex X 20  TE   Pulleys - flex Next   TE   Pulleys-IR   TE   Supine wand chest press 2 x 10  TE   Supine wand flex Next   TE   Supine wand ER/IR 2 x 10 Arms at sides  Progress to goalpost position.  TE   Standing wand behind back IR 2 x 10  TE   Supine flexion   TE   S-lying ER   TE   Standing wand flex   TE   Standing flexion   TE   ROWS: H   TA   ROWS: M   TA   ROWS: L TA   ER   TE   IR   TE               A:  Tolerated well. Above added to written HEP. Discussed anatomy, physiology, body mechanics, principles of loading, and progressive loading/activity.   P: Continue with rehab plan  Ronaldo Brothers PT    Treatment Charges: Mins Units   Initial Evaluation 25 1   Re-Evaluation     Ther Exercise         TE 15 1   Manual Therapy     MT     Ther Activities        TA     Gait Training          GT     Neuro Re-education NR     Modalities     Non-Billable Service Time     Other     Total Time/Units 40 2

## 2021-06-21 NOTE — PROGRESS NOTES
[address], Laparoscopic     RIGHT COLECTOMY  01/21/2014    Catie Colon - laparoscopic right colectomy \" purpose\": intestines flipped\"    TAMMIE-EN-Y GASTRIC BYPASS  1/25/2010 - Laparoscopic    SHOULDER ARTHROSCOPY Right 4/29/2021    RIGHT SHOULDER ARTHROSCOPY, ROTATOR CUFF REPAIR, SUBACROMIAL DECOMPRESSION AND DEBRIDEMENT (CPT 93382) TONY(ARTHREX)DEBRIDEMENT OF LABRUM , DEBRIDEMENT OF BICEPS performed by Gisel Rios DO at [facility] Right 04/29/2021    5 ANCHORS    TUBAL LIGATION      UPPER GASTROINTESTINAL ENDOSCOPY         Medications:   Current Outpatient Medications   Medication Sig Dispense Refill    [facility]) 7.5-325 MG per tablet Take 1 tablet by mouth every 8 hours as needed for Pain for up to 7 days. Intended supply: 7 days. Take lowest dose possible to manage pain 21 tablet 0    levothyroxine (SYNTHROID) 50 MCG tablet TAKE 1 TABLET BY MOUTH DAILY 90 tablet 1    fluticasone (FLONASE) 50 MCG/ACT nasal spray SHAKE LIQUID AND USE 1 SPRAY IN EACH NOSTRIL DAILY 16 g 2    ibuprofen (ADVIL;MOTRIN) 800 MG tablet TAKE 1 TABLET BY MOUTH THREE TIMES DAILY WITH MEALS AS NEEDED FOR PAIN      hydroCHLOROthiazide (HYDRODIURIL) 25 MG tablet Take 1 tablet by mouth every morning 30 tablet 5    calcium carbonate (OSCAL) 500 MG TABS tablet Take 1,000 mg by mouth daily      ferrous sulfate 325 (65 Fe) MG tablet Take 325 mg by mouth daily (with breakfast)      Multiple Vitamin (MULTIVITAMIN PO) Take 1 tablet by mouth 2 times daily. Ultra Rodrick        No current facility-administered medications for this visit. Occupation: Dispatcher . Physical demands include: sitting, . Status: not working.     Hobbies: baking, play with grandson    Patient Goals: pain relief, return to prior activity    Precautions/Contraindications: recent surgery    OBJECTIVE:     Observations: well nourished female, normal affect    Inspection: normal orthopedic exam.  Incision: edges Therapeutic Exercise  [x] Therapeutic Activity  [x] Neuromuscular Re-education   [] Gait Training  [] Balance Training  [] Aerobic conditioning  [] Manual Therapy  [] Massage/Fascial release   [] Work/Sport specific activities    [] Pain Neuroscience [] Cold/hotpack  [] Vasocompression  [] Electrical Stimulation  [] Lumbar/Cervical Traction  [] Ultrasound   [] Iontophoresis: 4 mg/mL Dexamethasone Sodium Phosphate 40-80 mAmin        [x] Instruction in HEP      []  Medication allergies reviewed for use of Dexamethasone Sodium Phosphate 4mg/ml  with iontophoresis treatments. Patient is not allergic. The following CPT codes are likely to be used in the care of this patient: 95954 PT Evaluation: Moderate Complexity , 86912 PT Re-Evaluation , 61951 Therapeutic Exercise , 26199 Neuromuscular Re-Education , 30761 Therapeutic Activities , 00336 Manual Therapy       Suggested Professional Referral: [x] No  [] Yes:     Patient Education:  [x] Plans/Goals, Risks/Benefits discussed  [x] Home exercise program  Method of Education: [x] Verbal  [x] Demo  [x] Written  Comprehension of Education:  [x] Verbalizes understanding. [x] Demonstrates understanding. [] Needs Review. [] Demonstrates/verbalizes understanding of HEP/Ed previously given. Frequency:  2 days per week for 6 weeks    Patient understands diagnosis/prognosis and consents to treatment, plan and goals: [x] Yes    [] No     Thank you for the opportunity to work with your patient. If you have questions or comments, please contact me at [phone]; fax: [phone]. Electronically signed by: Crow Newton, PT    Medicare Patients Only     Please sign Physician's Certification and return to: [address] [place] [address]  Dept: [phone]  Dept Fax: [phone] Certification / Comments     Frequency/Duration 2 days per week for 6 weeks.    Certification period from 6/21/2021 to 09/21/2021. I have reviewed the Plan of Care established for skilled therapy services and certify that the services are required and that they will be provided while the patient is under my care.     Physician's Comments/Revisions:               Physician's Printed Name:                                           [de-identified] Signature:                                                               Date:

## 2021-06-23 DIAGNOSIS — S46.011A TRAUMATIC COMPLETE TEAR OF RIGHT ROTATOR CUFF, INITIAL ENCOUNTER: ICD-10-CM

## 2021-06-23 DIAGNOSIS — M75.41 SHOULDER IMPINGEMENT, RIGHT: ICD-10-CM

## 2021-06-23 RX ORDER — HYDROCODONE BITARTRATE AND ACETAMINOPHEN 7.5; 325 MG/1; MG/1
1 TABLET ORAL EVERY 8 HOURS PRN
Qty: 21 TABLET | Refills: 0 | Status: SHIPPED
Start: 2021-06-23 | End: 2021-06-30 | Stop reason: SDUPTHER

## 2021-06-23 NOTE — TELEPHONE ENCOUNTER
Last appointment 6/17/2021  Next appointment   Future Appointments   Date Time Provider Jenny Rodgers   6/25/2021  1:40 PM Sweetie Raygoza PTA [facility]   6/30/2021  8:40 AM Sweetie Raygoza PTA [facility] PT [facility]   7/2/2021  8:40 AM Sweetie Raygoza PTA [facility] PT [facility]   8/19/2021 10:00 AM Fabi Jones, APRN - CNP [facility]      Last refill 6/16/21  DOS: 4/29/21    Patient called in requesting refill of     HYDROcodone-acetaminophen ([facility]) 7.5-325 MG per tablet [[id number]]      [facility] [address] - P [phone] Margemali Schultz [phone]   [address] [place] [id number]   Phone:  [phone]  Fax:  [phone]

## 2021-06-24 ENCOUNTER — TELEPHONE (OUTPATIENT)
Dept: FAMILY MEDICINE CLINIC | Age: 61
End: 2021-06-24

## 2021-06-24 NOTE — TELEPHONE ENCOUNTER
Patient called in stating that she is still having a cough. Patient asked if cough syrup could be called in to the pharmacy.

## 2021-06-25 ENCOUNTER — TREATMENT (OUTPATIENT)
Dept: PHYSICAL THERAPY | Age: 61
End: 2021-06-25
Payer: COMMERCIAL

## 2021-06-25 DIAGNOSIS — M75.41 SHOULDER IMPINGEMENT, RIGHT: Primary | ICD-10-CM

## 2021-06-25 DIAGNOSIS — S46.011A TRAUMATIC COMPLETE TEAR OF RIGHT ROTATOR CUFF, INITIAL ENCOUNTER: ICD-10-CM

## 2021-06-25 DIAGNOSIS — M19.011 ARTHRITIS OF RIGHT ACROMIOCLAVICULAR JOINT: ICD-10-CM

## 2021-06-25 DIAGNOSIS — S46.111A LABRAL TEAR OF LONG HEAD OF RIGHT BICEPS TENDON, INITIAL ENCOUNTER: ICD-10-CM

## 2021-06-25 PROCEDURE — 97110 THERAPEUTIC EXERCISES: CPT

## 2021-06-25 RX ORDER — GUAIFENESIN/DEXTROMETHORPHAN 100-10MG/5
10 SYRUP ORAL 3 TIMES DAILY PRN
Qty: 240 ML | Refills: 0 | Status: SHIPPED | OUTPATIENT
Start: 2021-06-25 | End: 2021-07-05

## 2021-06-25 NOTE — PROGRESS NOTES
flexion   TE   ROWS: H   TA   ROWS: M   TA   ROWS: L   TA   ER   TE   IR   TE               A:  Tolerated well.   Able to perform additional exercises which were added to her HEP  P: Continue with rehab plan  Dyllan Smith PTA    Treatment Charges: Mins Units   Initial Evaluation     Re-Evaluation     Ther Exercise         TE 40 3   Manual Therapy     MT     Ther Activities        TA     Gait Training          GT     Neuro Re-education NR     Modalities     Non-Billable Service Time     Other     Total Time/Units 40 3

## 2021-06-30 ENCOUNTER — TREATMENT (OUTPATIENT)
Dept: PHYSICAL THERAPY | Age: 61
End: 2021-06-30
Payer: COMMERCIAL

## 2021-06-30 DIAGNOSIS — M75.41 SHOULDER IMPINGEMENT, RIGHT: ICD-10-CM

## 2021-06-30 DIAGNOSIS — M19.011 ARTHRITIS OF RIGHT ACROMIOCLAVICULAR JOINT: ICD-10-CM

## 2021-06-30 DIAGNOSIS — S46.011A TRAUMATIC COMPLETE TEAR OF RIGHT ROTATOR CUFF, INITIAL ENCOUNTER: ICD-10-CM

## 2021-06-30 DIAGNOSIS — S46.111A LABRAL TEAR OF LONG HEAD OF RIGHT BICEPS TENDON, INITIAL ENCOUNTER: ICD-10-CM

## 2021-06-30 DIAGNOSIS — S46.011A TRAUMATIC COMPLETE TEAR OF RIGHT ROTATOR CUFF, INITIAL ENCOUNTER: Primary | ICD-10-CM

## 2021-06-30 PROCEDURE — 97110 THERAPEUTIC EXERCISES: CPT

## 2021-06-30 RX ORDER — HYDROCODONE BITARTRATE AND ACETAMINOPHEN 7.5; 325 MG/1; MG/1
1 TABLET ORAL EVERY 8 HOURS PRN
Qty: 21 TABLET | Refills: 0 | Status: SHIPPED
Start: 2021-06-30 | End: 2021-07-07 | Stop reason: SDUPTHER

## 2021-06-30 NOTE — PROGRESS NOTES
Physical Therapy Daily Treatment Note    Date: 2021  Patient Name: Ruth Weinberg  : 1960   MRN: [id number]  DOInjury: ~2/15/21  DOSx: 21   Referring Provider:  MISAEL Briceño - [address] [facility] [address] [place] [address]     Medical Diagnosis:      Diagnosis Orders   1. Traumatic complete tear of right rotator cuff, initial encounter     2. Labral tear of long head of right biceps tendon, initial encounter     3. Arthritis of right acromioclavicular joint     4. Shoulder impingement, right         Outcome Measure:   QuickDASH (Disorders of the Arm, Shoulder, and Hand) 80% disability    Access Code: [email or web address]  URL: [email or web address]  Date: 2021  Prepared by: Mary Postal    Exercises  Circular Shoulder Pendulum with Table Support - 2 x daily - 1 sets - 30 reps  Supine Shoulder Press - 2 x daily - 3 sets - 10 reps  Standing Shoulder External Rotation AAROM with Dowel - 2 x daily - 3 sets - 10 reps  Standing Shoulder Internal Rotation AAROM with Dowel - 2 x daily - 3 sets - 10 reps    12 weeks post-op: 2021      S: Pt reports 8/10 pain. States shoulder is consistently achy and throbbing  O:  Time 9332-6498     Visit 3/18 Repeat outcome measure at mid point and end. Pain Pain 8/10     ROM 90° Forward elevation,  40° ER,  IR to L4     Modalities       Use sparingly if at all. Prefer an active program.  MO   Manual      Stretching all directions. Please monitor ROM. Add stretching as needed. NEXT?  MT         Stretch      Table slides 2 x 10  TE   Wall Flexion   TE   Wall ER stretch   TE   Towel IR stretch   TE   IR reaching behind back   TE   Exercise      Pendulum Ex As needed   TE   Pulleys - flex X 3 min  TE   Pulleys-IR   TE   Supine wand chest press 3 x 10  TE   Supine wand press with wand flex 3 x 10  TE   Supine wand ER/IR 3 x 10   Goal post position TE   Standing wand behind back IR 3 x 10  TE   Supine flexion   TE   S-lying ER   TE   Standing wand

## 2021-07-02 ENCOUNTER — TREATMENT (OUTPATIENT)
Dept: PHYSICAL THERAPY | Age: 61
End: 2021-07-02
Payer: COMMERCIAL

## 2021-07-02 DIAGNOSIS — S46.011A TRAUMATIC COMPLETE TEAR OF RIGHT ROTATOR CUFF, INITIAL ENCOUNTER: Primary | ICD-10-CM

## 2021-07-02 DIAGNOSIS — M19.011 ARTHRITIS OF RIGHT ACROMIOCLAVICULAR JOINT: ICD-10-CM

## 2021-07-02 DIAGNOSIS — M75.41 SHOULDER IMPINGEMENT, RIGHT: ICD-10-CM

## 2021-07-02 DIAGNOSIS — S46.111A LABRAL TEAR OF LONG HEAD OF RIGHT BICEPS TENDON, INITIAL ENCOUNTER: ICD-10-CM

## 2021-07-02 PROCEDURE — G0283 ELEC STIM OTHER THAN WOUND: HCPCS

## 2021-07-02 PROCEDURE — 97110 THERAPEUTIC EXERCISES: CPT

## 2021-07-02 NOTE — PROGRESS NOTES
Physical Therapy Daily Treatment Note    Date: 2021  Patient Name: Matty Howell  : 1960   MRN: [id number]  DOInjury: ~2/15/21  DOSx: 21   Referring Provider:  MISAEL Connell - [address] [facility] [address] [place] [address]     Medical Diagnosis:      Diagnosis Orders   1. Traumatic complete tear of right rotator cuff, initial encounter     2. Labral tear of long head of right biceps tendon, initial encounter     3. Shoulder impingement, right     4. Arthritis of right acromioclavicular joint         Outcome Measure:   QuickDASH (Disorders of the Arm, Shoulder, and Hand) 80% disability    Access Code: [email or web address]  URL: [email or web address]  Date: 2021  Prepared by: Sarika Mendez    Exercises  Circular Shoulder Pendulum with Table Support - 2 x daily - 1 sets - 30 reps  Supine Shoulder Press - 2 x daily - 3 sets - 10 reps  Standing Shoulder External Rotation AAROM with Dowel - 2 x daily - 3 sets - 10 reps  Standing Shoulder Internal Rotation AAROM with Dowel - 2 x daily - 3 sets - 10 reps    12 weeks post-op: 2021      S: Pt reports 5/10 pain at rest and throughout the day but 9/10 pain at night especially when trying to sleep. States shoulder is consistently achy and throbbing  O: Started AROM 21  Time 3946-7203     Visit  Repeat outcome measure at mid point and end. Pain Pain at rest 5/10  Pain 9/10 especially at night     ROM 90° Forward elevation,  40° ER,  IR to L4     Modalities      Ice and ES X 20 min  MO   Manual      Stretching all directions. Please monitor ROM. Add stretching as needed. NEXT?  MT         Stretch      Table slides   TE   Wall Flexion   TE   Wall ER stretch   TE   Towel IR stretch   TE   IR reaching behind back   TE   Exercise      Pendulum Ex As needed   TE   Pulleys - flex X 3 min  TE   Pulleys-IR   TE   Supine wand chest press 3 x 10  TE   Supine wand press with wand flex 3 x 10  TE   Supine wand ER/IR 3 x 10   Goal post position TE   Standing wand behind back IR 3 x 10  TE   Supine flexion 2 x 10  TE   S-lying ER 2 x 10  TE   Standing wand flex   TE   Standing flexion   TE   ROWS: H   TA   ROWS: M   TA   ROWS: L   TA   ER   TE   IR   TE               A:  Tolerated well. Able to perform additional exercises which were added to her HEP. Added estim for pain relief, will follow up next visit to see if it was helpful.   P: Continue with rehab plan  Ruth Dawkins PTA    Treatment Charges: Mins Units   Initial Evaluation     Re-Evaluation     Ther Exercise         TE 40 3   Manual Therapy     MT     Ther Activities        TA     Gait Training          GT     Neuro Re-education NR     Modalities 15 1   Non-Billable Service Time     Other     Total Time/Units 55 4

## 2021-07-06 ENCOUNTER — TREATMENT (OUTPATIENT)
Dept: PHYSICAL THERAPY | Age: 61
End: 2021-07-06
Payer: COMMERCIAL

## 2021-07-06 DIAGNOSIS — S46.111A LABRAL TEAR OF LONG HEAD OF RIGHT BICEPS TENDON, INITIAL ENCOUNTER: ICD-10-CM

## 2021-07-06 DIAGNOSIS — M19.011 ARTHRITIS OF RIGHT ACROMIOCLAVICULAR JOINT: ICD-10-CM

## 2021-07-06 DIAGNOSIS — M75.41 SHOULDER IMPINGEMENT, RIGHT: ICD-10-CM

## 2021-07-06 DIAGNOSIS — S46.011A TRAUMATIC COMPLETE TEAR OF RIGHT ROTATOR CUFF, INITIAL ENCOUNTER: Primary | ICD-10-CM

## 2021-07-06 PROCEDURE — 97110 THERAPEUTIC EXERCISES: CPT

## 2021-07-06 PROCEDURE — G0283 ELEC STIM OTHER THAN WOUND: HCPCS

## 2021-07-06 NOTE — PROGRESS NOTES
Physical Therapy Daily Treatment Note    Date: 2021  Patient Name: Jose Kaba  : 1960   MRN: [id number]  DOInjury: ~2/15/21  DOSx: 21   Referring Provider:  MISAEL Mcclellan - [facility] [address] [place] [address]     Medical Diagnosis:      Diagnosis Orders   1. Traumatic complete tear of right rotator cuff, initial encounter     2. Labral tear of long head of right biceps tendon, initial encounter     3. Arthritis of right acromioclavicular joint     4. Shoulder impingement, right         Outcome Measure:   QuickDASH (Disorders of the Arm, Shoulder, and Hand) 80% disability    Access Code: [email or web address]  URL: [email or web address]  Date: 2021  Prepared by: Osmin Mcgarry    Exercises  Circular Shoulder Pendulum with Table Support - 2 x daily - 1 sets - 30 reps  Supine Shoulder Press - 2 x daily - 3 sets - 10 reps  Standing Shoulder External Rotation AAROM with Dowel - 2 x daily - 3 sets - 10 reps  Standing Shoulder Internal Rotation AAROM with Dowel - 2 x daily - 3 sets - 10 reps    12 weeks post-op: 2021      S: Pt reports 6-7/10 consistent dull ache that elevates to 9/10 as activity level increases  O: Started AROM 21  Time 1300-     Visit  Repeat outcome measure at mid point and end. Pain Pain at rest 5/10  Pain 9/10 especially at night     ROM 90° Forward elevation,  40° ER,  IR to L4     Modalities      Ice and ES X 20 min  MO   Manual      Stretching all directions. Please monitor ROM. Add stretching as needed. NEXT?  MT         Stretch      Table slides   TE   Wall Flexion   TE   Wall ER stretch   TE   Towel IR stretch   TE   IR reaching behind back   TE   Exercise      Pendulum Ex As needed   TE   Pulleys - flex X 3 min  TE   Pulleys-IR   TE   Supine wand chest press 3 x 10  TE   Supine wand press with wand shld flex 3 x 10  TE   Supine wand ER/IR 3 x 10   Goal post position TE   Standing wand behind back IR 3 x 10  TE   Supine flexion 3 x 10 TE   S-lying ER 3 x 10  TE   Standing wand flex NEXT? TE   Standing flexion   TE   ROWS: H   TA   ROWS: M   TA   ROWS: L   TA   ER   TE   IR   TE               A:  Tolerated well.  ROM painful  P: Continue with rehab plan  Adelina Jimenez PTA    Treatment Charges: Mins Units   Initial Evaluation     Re-Evaluation     Ther Exercise         TE 40 3   Manual Therapy     MT     Ther Activities        TA     Gait Training          GT     Neuro Re-education NR     Modalities 15 1   Non-Billable Service Time     Other     Total Time/Units 55 4

## 2021-07-07 DIAGNOSIS — S46.011A TRAUMATIC COMPLETE TEAR OF RIGHT ROTATOR CUFF, INITIAL ENCOUNTER: ICD-10-CM

## 2021-07-07 DIAGNOSIS — M75.41 SHOULDER IMPINGEMENT, RIGHT: ICD-10-CM

## 2021-07-07 RX ORDER — HYDROCODONE BITARTRATE AND ACETAMINOPHEN 7.5; 325 MG/1; MG/1
1 TABLET ORAL EVERY 12 HOURS PRN
Qty: 14 TABLET | Refills: 0 | Status: SHIPPED
Start: 2021-07-07 | End: 2021-07-14 | Stop reason: SDUPTHER

## 2021-07-12 ENCOUNTER — TREATMENT (OUTPATIENT)
Dept: PHYSICAL THERAPY | Age: 61
End: 2021-07-12
Payer: COMMERCIAL

## 2021-07-12 DIAGNOSIS — S46.111A LABRAL TEAR OF LONG HEAD OF RIGHT BICEPS TENDON, INITIAL ENCOUNTER: ICD-10-CM

## 2021-07-12 DIAGNOSIS — M19.011 ARTHRITIS OF RIGHT ACROMIOCLAVICULAR JOINT: ICD-10-CM

## 2021-07-12 DIAGNOSIS — M75.41 SHOULDER IMPINGEMENT, RIGHT: ICD-10-CM

## 2021-07-12 DIAGNOSIS — S46.011A TRAUMATIC COMPLETE TEAR OF RIGHT ROTATOR CUFF, INITIAL ENCOUNTER: Primary | ICD-10-CM

## 2021-07-12 PROCEDURE — 97110 THERAPEUTIC EXERCISES: CPT | Performed by: PHYSICAL THERAPIST

## 2021-07-12 NOTE — PROGRESS NOTES
Physical Therapy Daily Treatment Note    Date: 2021  Patient Name: Wili Contreras  : 1960   MRN: [id number]  DOInjury: ~2/15/21  DOSx: 21   Referring Provider:  MISAEL Dunn - [address] [facility] [address] [place] [address]     Medical Diagnosis:      Diagnosis Orders   1. Traumatic complete tear of right rotator cuff, initial encounter     2. Labral tear of long head of right biceps tendon, initial encounter     3. Arthritis of right acromioclavicular joint     4. Shoulder impingement, right     OPERATION: (1)right shoulder arthroscopy with arthroscopic rotator cuff   Repair (3-4 cm). (2) Subacromial arch decompression. (3) debridement labrum/biceps (4) noah procedure    Outcome Measure:   QuickDASH (Disorders of the Arm, Shoulder, and Hand) 80% disability    Access Code: [email or web address]  URL: [email or web address]  Date: 2021  Prepared by: Huang Hanna    Exercises  Circular Shoulder Pendulum with Table Support - 2 x daily - 1 sets - 30 reps  Supine Shoulder Press - 1 x daily - 3 sets - 10 reps  Standing Shoulder External Rotation AAROM with Dowel - 1 x daily - 3 sets - 10 reps  Standing Shoulder Internal Rotation AAROM with Dowel - 1 x daily - 3 sets - 10 reps  Standing Single Arm Shoulder Flexion Stretch on Wall - 2 x daily - 3 reps - 20 sec hold  Standing Shoulder Internal Rotation Stretch with Towel - 2 x daily - 3 reps - 20 sec hold  Doorway Pec Stretch at 90 Degrees Abduction - 2 x daily - 3 sets - 3 reps - 20 sec hold      12 weeks post-op: 2021      S: Returned to work 6 days ago; sore. Performing HEP. Overall notes improvement - was able to apply eye makeup today. Pain continues. O:   Time 0230-5659     Visit  Repeat outcome measure at mid point and end.     Pain Pain at rest 5/10     ROM AROM: 100° Forward elevation,  40° ER,  IR to L3  PROM: 150° Forward elevation,  65° ER,  40 IR 21    Modalities         MO   Manual      Stretching all directions. Please monitor ROM. Add stretching as needed. MT         Stretch      Wall Flexion 3 x 20 sec  TE   Wall ER stretch (in 90 ABD) 3 x 20 sec  TE   Towel IR stretch 3 x 20 sec  TE   Exercise      Pulleys - flex   TE   Pulleys-IR   TE   Supine wand chest press  TE   Supine wand press with wand shld flex  TE   Supine wand ER/IR  Goal post position TE   Standing wand behind back IR  TE   Supine flexion  TE   S-lying ER  TE   Standing wand flex  TE   Standing flexion   TE   ROWS: H   TA   ROWS: M   TA   ROWS: L   TA   ER   TE   IR   TE               A:  Focused on developing home stretching program (picking and choosing those best suited to patient) and altering the frequency of her wand program (reduced to 1 time per day). Went thru each exercise and discussed hoe to alter intensity and frequency to maintain sustainability. She will stretch 2 times a day.   P: Continue with rehab plan  Dimple Suh PT    Treatment Charges: Mins Units   Initial Evaluation     Re-Evaluation     Ther Exercise         TE 40 3   Manual Therapy     MT     Ther Activities        TA     Gait Training          GT     Neuro Re-education NR     Modalities     Non-Billable Service Time     Other     Total Time/Units 40 3

## 2021-07-14 ENCOUNTER — TREATMENT (OUTPATIENT)
Dept: PHYSICAL THERAPY | Age: 61
End: 2021-07-14
Payer: COMMERCIAL

## 2021-07-14 DIAGNOSIS — S46.111A LABRAL TEAR OF LONG HEAD OF RIGHT BICEPS TENDON, INITIAL ENCOUNTER: ICD-10-CM

## 2021-07-14 DIAGNOSIS — S46.011A TRAUMATIC COMPLETE TEAR OF RIGHT ROTATOR CUFF, INITIAL ENCOUNTER: ICD-10-CM

## 2021-07-14 DIAGNOSIS — M75.41 SHOULDER IMPINGEMENT, RIGHT: ICD-10-CM

## 2021-07-14 DIAGNOSIS — S46.011A TRAUMATIC COMPLETE TEAR OF RIGHT ROTATOR CUFF, INITIAL ENCOUNTER: Primary | ICD-10-CM

## 2021-07-14 DIAGNOSIS — M19.011 ARTHRITIS OF RIGHT ACROMIOCLAVICULAR JOINT: ICD-10-CM

## 2021-07-14 PROCEDURE — 97110 THERAPEUTIC EXERCISES: CPT | Performed by: PHYSICAL THERAPIST

## 2021-07-14 RX ORDER — HYDROCODONE BITARTRATE AND ACETAMINOPHEN 7.5; 325 MG/1; MG/1
1 TABLET ORAL EVERY 12 HOURS PRN
Qty: 14 TABLET | Refills: 0 | Status: CANCELLED | OUTPATIENT
Start: 2021-07-14 | End: 2021-07-21

## 2021-07-14 RX ORDER — HYDROCODONE BITARTRATE AND ACETAMINOPHEN 7.5; 325 MG/1; MG/1
1 TABLET ORAL EVERY 12 HOURS PRN
Qty: 14 TABLET | Refills: 0 | Status: SHIPPED
Start: 2021-07-14 | End: 2021-07-20 | Stop reason: SDUPTHER

## 2021-07-14 NOTE — PROGRESS NOTES
Physical Therapy Daily Treatment Note    Date: 2021  Patient Name: Mel Kwok  : 1960   MRN: [id number]  DOInjury: ~2/15/21  DOSx: 21   Referring Provider:  MISAEL [place] [facility]     Medical Diagnosis:      Diagnosis Orders   1. Traumatic complete tear of right rotator cuff, initial encounter     2. Labral tear of long head of right biceps tendon, initial encounter     3. Arthritis of right acromioclavicular joint     4. Shoulder impingement, right     OPERATION: (1)right shoulder arthroscopy with arthroscopic rotator cuff   Repair (3-4 cm). (2) Subacromial arch decompression. (3) debridement labrum/biceps (4) noah procedure    Outcome Measure:   QuickDASH (Disorders of the Arm, Shoulder, and Hand) 80% disability    PRINTED  Access Code: [email or web address]  URL: [email or web address]  Date: 2021  Prepared by: Casper Naranjo    Exercises  Circular Shoulder Pendulum with Table Support - 2 x daily - 1 sets - 30 reps  Standing Single Arm Shoulder Flexion Stretch on Wall - 2 x daily - 3 reps - 20 sec hold  Standing Shoulder Internal Rotation Stretch with Towel - 2 x daily - 3 reps - 20 sec hold  Doorway Pec Stretch at 90 Degrees Abduction - 2 x daily - 3 sets - 3 reps - 20 sec hold  Standing Shoulder Flexion AAROM with Dowel - 2 x daily - 3 sets - 10 reps  Standing Shoulder Flexion Full Range - 2 x daily - 3 sets - 10 reps  Sidelying Shoulder External Rotation - 2 x daily - 3 sets - 10 reps      12 weeks post-op: 2021      S: Sore, but no worse. Home stretching going well. O:   Time 1999-5683     Visit  Repeat outcome measure at mid point and end. Pain Pain at rest 5/10     ROM AROM: 120° Forward elevation,  40° ER,  IR to L1  PROM: 150° Forward elevation,  65° ER,  40 IR 21    Modalities         MO   Manual      Stretching all directions. Please monitor ROM. Add stretching as needed.    MT         Stretch      Wall Flexion Reviewed + 3 x 20 sec   TE   Wall ER stretch (in 90 ABD) Reviewed + 3 x 20 sec   TE   Towel IR stretch Reviewed + 3 x 20 sec   TE   Exercise      Pulleys - flex Next   TE   Pulleys-IR Next as adelina   TE     TE   Standing wand flex 2 x 10 TE   Standing flexion 2 x 10  TE   S-lying ER 3 x 10     ROWS: H Green 7/22  TA   ROWS: M Green 7/22  TA   ROWS: L Green 7/22  TA   ER Green 7/22  TE   IR Green 7/22  TE   Flexion in scapular plane                                    A:  Focused on developing home wand program to accompany her home stretching program.  Patient reports discomfort with supine exercises. Opted for standing ones instead as they are better tolerated. P: Will review HEP next visit (7/20/21) and add pulleys. Will progress to strengthening as noted above on 7/22/21. Will reduce or eliminate wand program on 7/22/21 in favor of strengthening and stretching. Will add manual stretching as neccessary.    Aftab Parker, PT    Treatment Charges: Mins Units   Initial Evaluation     Re-Evaluation     Ther Exercise         TE 40 3   Manual Therapy     MT     Ther Activities        TA     Gait Training          GT     Neuro Re-education NR     Modalities     Non-Billable Service Time     Other     Total Time/Units 40 3

## 2021-07-20 ENCOUNTER — TREATMENT (OUTPATIENT)
Dept: PHYSICAL THERAPY | Age: 61
End: 2021-07-20
Payer: COMMERCIAL

## 2021-07-20 DIAGNOSIS — M19.011 ARTHRITIS OF RIGHT ACROMIOCLAVICULAR JOINT: ICD-10-CM

## 2021-07-20 DIAGNOSIS — M75.41 SHOULDER IMPINGEMENT, RIGHT: ICD-10-CM

## 2021-07-20 DIAGNOSIS — S46.011A TRAUMATIC COMPLETE TEAR OF RIGHT ROTATOR CUFF, INITIAL ENCOUNTER: Primary | ICD-10-CM

## 2021-07-20 DIAGNOSIS — S46.011A TRAUMATIC COMPLETE TEAR OF RIGHT ROTATOR CUFF, INITIAL ENCOUNTER: ICD-10-CM

## 2021-07-20 DIAGNOSIS — S46.111A LABRAL TEAR OF LONG HEAD OF RIGHT BICEPS TENDON, INITIAL ENCOUNTER: ICD-10-CM

## 2021-07-20 PROCEDURE — 97110 THERAPEUTIC EXERCISES: CPT

## 2021-07-20 RX ORDER — HYDROCODONE BITARTRATE AND ACETAMINOPHEN 7.5; 325 MG/1; MG/1
1 TABLET ORAL EVERY 12 HOURS PRN
Qty: 14 TABLET | Refills: 0 | Status: SHIPPED
Start: 2021-07-20 | End: 2021-07-27 | Stop reason: SDUPTHER

## 2021-07-20 NOTE — PROGRESS NOTES
Physical Therapy Daily Treatment Note    Date: 2021  Patient Name: Eryn Schmid  : 1960   MRN: [id number]  DOInjury: ~2/15/21  DOSx: 21     Referring Provider:    MISAEL Warner - [facility] [address] [place] [address]       Medical Diagnosis:    Diagnosis Orders   1. Traumatic complete tear of right rotator cuff, initial encounter     2. Labral tear of long head of right biceps tendon, initial encounter     3. Arthritis of right acromioclavicular joint     4. Shoulder impingement, right     OPERATION: (1)right shoulder arthroscopy with arthroscopic rotator cuff   Repair (3-4 cm). (2) Subacromial arch decompression. (3) debridement labrum/biceps (4) noah procedure    Outcome Measure:  QuickDASH (Disorders of the Arm, Shoulder, and Hand) 80% disability    PRINTED  Access Code: [email or web address]  URL: [email or web address]  Date: 2021  Prepared by: Catalina Michaud    Exercises  Circular Shoulder Pendulum with Table Support - 2 x daily - 1 sets - 30 reps  Standing Single Arm Shoulder Flexion Stretch on Wall - 2 x daily - 3 reps - 20 sec hold  Standing Shoulder Internal Rotation Stretch with Towel - 2 x daily - 3 reps - 20 sec hold  Doorway Pec Stretch at 90 Degrees Abduction - 2 x daily - 3 sets - 3 reps - 20 sec hold  Standing Shoulder Flexion AAROM with Dowel - 2 x daily - 3 sets - 10 reps  Standing Shoulder Flexion Full Range - 2 x daily - 3 sets - 10 reps  Sidelying Shoulder External Rotation - 2 x daily - 3 sets - 10 reps    12 weeks post-op: 2021    S: Patient states she was doing HEP and felt a pop and has had increased soreness ever since. Patient also states she has had limited range because of this. O:   Time [id number]     Visit  Repeat outcome measure at mid point and end.     Pain Pain at rest 7/10  Pain with activity 9/10     ROM AROM: 120° Forward elevation,  40° ER,  IR to L1  PROM: 150° Forward elevation,  65° ER,  40 IR 21    Modalities MO   Manual      Stretching all directions. Please monitor ROM. Add stretching as needed. MT         Stretch      Wall Flexion Reviewed + 3 x 20 sec   TE   Wall ER stretch (in 90 ABD) Reviewed + 3 x 20 sec   TE   Towel IR stretch Reviewed + 3 x 20 sec   TE   Exercise      Pulleys - flex 3 min  TE   Pulleys-IR 3 min  TE     TE   Standing wand flex 2 x 10 TE   Standing flexion 2 x 10  TE   S-lying ER 3 x 10     ROWS: H Green 7/22  TA   ROWS: M Green 7/22  TA   ROWS: L Green 7/22  TA   ER Green 7/22  TE   IR Green 7/22  TE   Flexion in scapular plane                                    A: Patient had poor tolerance to treatment due to increased soreness. Patient states compliance with HEP but in limited range. P: Will progress to strengthening as noted above on 7/22/21 as tolerated. Will reduce or eliminate wand program on 7/22/21 in favor of strengthening and stretching. Will add manual stretching as neccessary.    Niko Juarez PTA    Treatment Charges: Mins Units   Initial Evaluation     Re-Evaluation     Ther Exercise         TE 40 3   Manual Therapy     MT     Ther Activities        TA     Gait Training          GT     Neuro Re-education NR     Modalities     Non-Billable Service Time     Other     Total Time/Units 40 3

## 2021-07-27 ENCOUNTER — TREATMENT (OUTPATIENT)
Dept: PHYSICAL THERAPY | Age: 61
End: 2021-07-27
Payer: COMMERCIAL

## 2021-07-27 DIAGNOSIS — M75.41 SHOULDER IMPINGEMENT, RIGHT: ICD-10-CM

## 2021-07-27 DIAGNOSIS — S46.011A TRAUMATIC COMPLETE TEAR OF RIGHT ROTATOR CUFF, INITIAL ENCOUNTER: Primary | ICD-10-CM

## 2021-07-27 DIAGNOSIS — S46.111A LABRAL TEAR OF LONG HEAD OF RIGHT BICEPS TENDON, INITIAL ENCOUNTER: ICD-10-CM

## 2021-07-27 DIAGNOSIS — M19.011 ARTHRITIS OF RIGHT ACROMIOCLAVICULAR JOINT: ICD-10-CM

## 2021-07-27 DIAGNOSIS — S46.011A TRAUMATIC COMPLETE TEAR OF RIGHT ROTATOR CUFF, INITIAL ENCOUNTER: ICD-10-CM

## 2021-07-27 PROCEDURE — 97110 THERAPEUTIC EXERCISES: CPT

## 2021-07-27 RX ORDER — HYDROCODONE BITARTRATE AND ACETAMINOPHEN 7.5; 325 MG/1; MG/1
1 TABLET ORAL EVERY 12 HOURS PRN
Qty: 14 TABLET | Refills: 0 | Status: SHIPPED
Start: 2021-07-27 | End: 2021-08-03 | Stop reason: SDUPTHER

## 2021-07-27 NOTE — PROGRESS NOTES
Physical Therapy Daily Treatment Note    Date: 2021  Patient Name: Quentin Palomares  : 1960   MRN: [id number]  DOInjury: ~2/15/21  DOSx: 21     Referring Provider:    MISAEL Lagunas - [facility] [address] [place] [address]       Medical Diagnosis:    Diagnosis Orders   1. Traumatic complete tear of right rotator cuff, initial encounter     2. Labral tear of long head of right biceps tendon, initial encounter     3. Arthritis of right acromioclavicular joint     4. Shoulder impingement, right     OPERATION: (1)right shoulder arthroscopy with arthroscopic rotator cuff   Repair (3-4 cm). (2) Subacromial arch decompression. (3) debridement labrum/biceps (4) noah procedure    Outcome Measure:  QuickDASH (Disorders of the Arm, Shoulder, and Hand) 80% disability    PRINTED  Access Code: [email or web address]  URL: [email or web address]  Date: 2021  Prepared by: Jacinto Saenz    Exercises  Circular Shoulder Pendulum with Table Support - 2 x daily - 1 sets - 30 reps  Standing Single Arm Shoulder Flexion Stretch on Wall - 2 x daily - 3 reps - 20 sec hold  Standing Shoulder Internal Rotation Stretch with Towel - 2 x daily - 3 reps - 20 sec hold  Doorway Pec Stretch at 90 Degrees Abduction - 2 x daily - 3 sets - 3 reps - 20 sec hold  Standing Shoulder Flexion AAROM with Dowel - 2 x daily - 3 sets - 10 reps  Standing Shoulder Flexion Full Range - 2 x daily - 3 sets - 10 reps  Sidelying Shoulder External Rotation - 2 x daily - 3 sets - 10 reps    12 weeks post-op: 2021    S: Patient feels better and says she has a \"good sore\". O:   Time [id number]     Visit  Repeat outcome measure at mid point and end. Pain Pain at rest 7/10  Pain with activity 10     ROM AROM: 120° Forward elevation,  40° ER,  IR to L1  PROM: 150° Forward elevation,  65° ER,  40 IR 21    Modalities         MO   Manual      Stretching all directions. Please monitor ROM. Add stretching as needed.    MT Stretch      Wall Flexion 3 x 20 sec   TE   Wall ER stretch (in 90 ABD) 3 x 20 sec   TE   Towel IR stretch HEP   TE   Exercise      Pulleys - flex 3 min  TE   Pulleys- IR 3 min  TE     TE   Standing wand flex  TE   Standing flexion 2 x 10  TE   S-lying ER 3 x 10     ROWS: H Green 2 x 10  TA   ROWS: M Green 2 x 10  TA   ROWS: L Green 2 x 10  TA   ER Red 2 x 10  TE   IR Red 2 x 10  TE   Flexion in scapular plane                                    A: Patient had good tolerance to newly added exercises. Weakness most evident in ER.   P: Continue with rehab plan. Continue to progress strengthening program and will add manual stretching as neccessary.    Will Pastures, PTA    Treatment Charges: Mins Units   Initial Evaluation     Re-Evaluation     Ther Exercise         TE 40 3   Manual Therapy     MT     Ther Activities        TA     Gait Training          GT     Neuro Re-education NR     Modalities     Non-Billable Service Time     Other     Total Time/Units 40 3

## 2021-07-29 ENCOUNTER — TREATMENT (OUTPATIENT)
Dept: PHYSICAL THERAPY | Age: 61
End: 2021-07-29
Payer: COMMERCIAL

## 2021-07-29 DIAGNOSIS — S46.011A TRAUMATIC COMPLETE TEAR OF RIGHT ROTATOR CUFF, INITIAL ENCOUNTER: Primary | ICD-10-CM

## 2021-07-29 DIAGNOSIS — S46.111A LABRAL TEAR OF LONG HEAD OF RIGHT BICEPS TENDON, INITIAL ENCOUNTER: ICD-10-CM

## 2021-07-29 DIAGNOSIS — M75.41 SHOULDER IMPINGEMENT, RIGHT: ICD-10-CM

## 2021-07-29 DIAGNOSIS — M19.011 ARTHRITIS OF RIGHT ACROMIOCLAVICULAR JOINT: ICD-10-CM

## 2021-07-29 PROCEDURE — 97140 MANUAL THERAPY 1/> REGIONS: CPT

## 2021-07-29 PROCEDURE — 97110 THERAPEUTIC EXERCISES: CPT

## 2021-07-29 NOTE — PROGRESS NOTES
Physical Therapy Daily Treatment Note    Date: 2021  Patient Name: Lydia Motta  : 1960   MRN: [id number]  DOInjury: ~2/15/21  DOSx: 21     Referring Provider:    Carola Arizmendi, APRN - [address] [facility] [address] [place] [address]       Medical Diagnosis:    Diagnosis Orders   1. Traumatic complete tear of right rotator cuff, initial encounter     2. Labral tear of long head of right biceps tendon, initial encounter     3. Arthritis of right acromioclavicular joint     4. Shoulder impingement, right     OPERATION: (1)right shoulder arthroscopy with arthroscopic rotator cuff   Repair (3-4 cm). (2) Subacromial arch decompression. (3) debridement labrum/biceps (4) noah procedure    Outcome Measure:  QuickDASH (Disorders of the Arm, Shoulder, and Hand) 80% disability    PRINTED  Access Code: [email or web address]  URL: [email or web address]  Date: 2021  Prepared by: Dali Rae    Exercises  Circular Shoulder Pendulum with Table Support - 2 x daily - 1 sets - 30 reps  Standing Single Arm Shoulder Flexion Stretch on Wall - 2 x daily - 3 reps - 20 sec hold  Standing Shoulder Internal Rotation Stretch with Towel - 2 x daily - 3 reps - 20 sec hold  Doorway Pec Stretch at 90 Degrees Abduction - 2 x daily - 3 sets - 3 reps - 20 sec hold  Standing Shoulder Flexion AAROM with Dowel - 2 x daily - 3 sets - 10 reps  Standing Shoulder Flexion Full Range - 2 x daily - 3 sets - 10 reps  Sidelying Shoulder External Rotation - 2 x daily - 3 sets - 10 reps    12 weeks post-op: 2021     S: Patient had increased soreness after last session but recovered quickly. O:   Time 6947-1919     Visit 10/18 Repeat outcome measure at mid point and end. Pain Pain at rest 7/10  Pain with activity 9/10     ROM AROM: 145° Forward elevation,  50° ER, 58 IR 21    Modalities         MO   Manual      Stretching all directions.   10 min  MT         Stretch      Wall Flexion HEP TE   Wall ER stretch (in 90 ABD) \" TE   Towel IR stretch HEP   TE   Exercise      Pulleys - flex 3 min  TE   Pulleys- IR 3 min  TE     TE   Standing wand flex  TE   Standing flexion Black wand 2 x 10  TE   S-lying ER 1# 2 x 10     ROWS: H Green 2 x 10  TA   ROWS: M Green 2 x 10  TA   ROWS: L Green 2 x 10  TA   ER Red 2 x 10  TE   IR Red 2 x 10  TE   Flexion in scapular plane                                    A: Patient had good tolerance to newly added exercises. Weakness most evident in ER. Added in manual stretching due to limited ROM. Patient to continue wall stretches at home. Discuss possibility of muscle soreness secondary to addition of stretches. Instructed to ice to relieve if necessary. P: Continue with rehab plan.  Continue to progress strengthening program.  Jodee Ha PTA    Treatment Charges: Mins Units   Initial Evaluation     Re-Evaluation     Ther Exercise         TE 30 2   Manual Therapy     MT 10 1   Ther Activities        TA     Gait Training          GT     Neuro Re-education NR     Modalities     Non-Billable Service Time     Other     Total Time/Units 40 3

## 2021-08-03 DIAGNOSIS — M75.41 SHOULDER IMPINGEMENT, RIGHT: ICD-10-CM

## 2021-08-03 DIAGNOSIS — S46.011A TRAUMATIC COMPLETE TEAR OF RIGHT ROTATOR CUFF, INITIAL ENCOUNTER: ICD-10-CM

## 2021-08-03 RX ORDER — HYDROCODONE BITARTRATE AND ACETAMINOPHEN 7.5; 325 MG/1; MG/1
1 TABLET ORAL DAILY PRN
Qty: 7 TABLET | Refills: 0 | Status: SHIPPED
Start: 2021-08-03 | End: 2021-08-10 | Stop reason: SDUPTHER

## 2021-08-04 ENCOUNTER — TELEPHONE (OUTPATIENT)
Dept: ORTHOPEDIC SURGERY | Age: 61
End: 2021-08-04

## 2021-08-04 ENCOUNTER — TREATMENT (OUTPATIENT)
Dept: PHYSICAL THERAPY | Age: 61
End: 2021-08-04
Payer: COMMERCIAL

## 2021-08-04 DIAGNOSIS — S46.011A TRAUMATIC COMPLETE TEAR OF RIGHT ROTATOR CUFF, INITIAL ENCOUNTER: Primary | ICD-10-CM

## 2021-08-04 DIAGNOSIS — S46.111A LABRAL TEAR OF LONG HEAD OF RIGHT BICEPS TENDON, INITIAL ENCOUNTER: ICD-10-CM

## 2021-08-04 DIAGNOSIS — M75.41 SHOULDER IMPINGEMENT, RIGHT: ICD-10-CM

## 2021-08-04 DIAGNOSIS — M19.011 ARTHRITIS OF RIGHT ACROMIOCLAVICULAR JOINT: ICD-10-CM

## 2021-08-04 PROCEDURE — 97110 THERAPEUTIC EXERCISES: CPT

## 2021-08-04 PROCEDURE — 97140 MANUAL THERAPY 1/> REGIONS: CPT

## 2021-08-04 NOTE — PROGRESS NOTES
Physical Therapy Daily Treatment Note    Date: 2021  Patient Name: Elma Herman  : 1960   MRN: [id number]  DOInjury: ~2/15/21  DOSx: 21     Referring Provider:    Annette Sánchez, APRN - [address] [facility] [address] [place] [address]       Medical Diagnosis:    Diagnosis Orders   1. Traumatic complete tear of right rotator cuff, initial encounter     2. Labral tear of long head of right biceps tendon, initial encounter     3. Arthritis of right acromioclavicular joint     4. Shoulder impingement, right     OPERATION: (1)right shoulder arthroscopy with arthroscopic rotator cuff   Repair (3-4 cm). (2) Subacromial arch decompression. (3) debridement labrum/biceps (4) noah procedure    Outcome Measure:  QuickDASH (Disorders of the Arm, Shoulder, and Hand) 80% disability    PRINTED  Access Code: [email or web address]  URL: [email or web address]  Date: 2021  Prepared by: Ansley Tony    Exercises  Circular Shoulder Pendulum with Table Support - 2 x daily - 1 sets - 30 reps  Standing Single Arm Shoulder Flexion Stretch on Wall - 2 x daily - 3 reps - 20 sec hold  Standing Shoulder Internal Rotation Stretch with Towel - 2 x daily - 3 reps - 20 sec hold  Doorway Pec Stretch at 90 Degrees Abduction - 2 x daily - 3 sets - 3 reps - 20 sec hold  Standing Shoulder Flexion AAROM with Dowel - 2 x daily - 3 sets - 10 reps  Standing Shoulder Flexion Full Range - 2 x daily - 3 sets - 10 reps  Sidelying Shoulder External Rotation - 2 x daily - 3 sets - 10 reps    12 weeks post-op: 2021     S: Patient states she hurts all the time. Patient feels mobility is improving but is always sore. Ices it 2-3x daily. O:   Time 6921-3554     Visit  Repeat outcome measure at mid point and end.     Pain Pain at rest 6/10  Pain with activity 9/10     ROM AROM: 145° Forward elevation,  50° ER, 58 IR 21    Modalities         MO   Manual      Stretching ER/Flex 10 min  MT         Stretch      Wall Flexion HEP TE Wall ER stretch (in 90 ABD) \" TE   Towel IR stretch HEP   TE   Exercise      Pulleys - flex 3 min  TE   Pulleys- IR 3 min  TE   Standing wand ER Black wand 2 x 10? TE   Standing wand flex Black wand 2 x 10 TE   Supine flexion 1# 2 x 10  TE   S-lying ER 1# 3 x 10     ROWS: H Green 2 x 10  TA   ROWS: M Green 2 x 10  TA   ROWS: L Green 2 x 10  TA   ER Red 2 x 10  TE   IR Red 2 x 10  TE   Flexion in scapular plane                                    A: ROM coming along nicely, but continues to display weakness in all motions. P: Continue with rehab plan.  Continue to progress strengthening program.  Clair Oreilly, PTA    Treatment Charges: Mins Units   Initial Evaluation     Re-Evaluation     Ther Exercise         TE 30 2   Manual Therapy     MT 10 1   Ther Activities        TA     Gait Training          GT     Neuro Re-education NR     Modalities     Non-Billable Service Time     Other     Total Time/Units 40 3

## 2021-08-09 ENCOUNTER — TREATMENT (OUTPATIENT)
Dept: PHYSICAL THERAPY | Age: 61
End: 2021-08-09
Payer: COMMERCIAL

## 2021-08-09 DIAGNOSIS — S46.011A TRAUMATIC COMPLETE TEAR OF RIGHT ROTATOR CUFF, INITIAL ENCOUNTER: Primary | ICD-10-CM

## 2021-08-09 DIAGNOSIS — S46.111A LABRAL TEAR OF LONG HEAD OF RIGHT BICEPS TENDON, INITIAL ENCOUNTER: ICD-10-CM

## 2021-08-09 DIAGNOSIS — M75.41 SHOULDER IMPINGEMENT, RIGHT: ICD-10-CM

## 2021-08-09 DIAGNOSIS — M19.011 ARTHRITIS OF RIGHT ACROMIOCLAVICULAR JOINT: ICD-10-CM

## 2021-08-09 PROCEDURE — 97110 THERAPEUTIC EXERCISES: CPT | Performed by: PHYSICAL THERAPIST

## 2021-08-09 NOTE — PROGRESS NOTES
Physical Therapy Daily Treatment Note    Date: 2021  Patient Name: Estephania Pulido  : 1960   MRN: [id number]  DOInjury: ~2/15/21  DOSx: 21     Referring Provider:    MISAEL Harmon - [facility] [address] [place] [address]       Medical Diagnosis:    Diagnosis Orders   1. Traumatic complete tear of right rotator cuff, initial encounter     2. Labral tear of long head of right biceps tendon, initial encounter     3. Arthritis of right acromioclavicular joint     4. Shoulder impingement, right     OPERATION: (1)right shoulder arthroscopy with arthroscopic rotator cuff   Repair (3-4 cm). (2) Subacromial arch decompression. (3) debridement labrum/biceps (4) noah procedure    Outcome Measure:  QuickDASH (Disorders of the Arm, Shoulder, and Hand) 80% disability    PRINTED  Access Code: [email or web address]  URL: [email or web address]  Date: 2021  Prepared by: Cabrera May    Exercises  Circular Shoulder Pendulum with Table Support - 1 x daily - 1 sets - 30 reps  Standing Shoulder Flexion AAROM with Dowel - 1 x daily - 3 sets - 10 reps  Supine Shoulder External Rotation with Dowel - 1 x daily - 3 sets - 10 reps  Standing Shoulder Flexion Full Range - 1 x daily - 3 sets - 10 reps      12 weeks post-op: 2021     S: Feeling better. Notes improved motion and function. Reports it is very weak. O:   Time [id number]     Visit  Repeat outcome measure at mid point and end.     Pain Pain 6/10     ROM AROM: 150° Forward elevation,  50° ER,  IR to T10  PROM: 170 flex, 85 ER, 60 IR    Passive mobility is symmetrical as of 21    Modalities         MO   Manual      Stretching ER/Flex  Discontinued   MT         Stretch      Wall Flexion  Discontinued HEP TE   Wall ER stretch (in 90 ABD)  Discontinued \" TE   Towel IR stretch HEP   TE   Exercise      Pulleys - flex  Discontinued  TE   Pulleys- IR  Discontinued  TE   Standing wand ER  Discontinued  TE   Standing wand flex

## 2021-08-10 DIAGNOSIS — M75.41 SHOULDER IMPINGEMENT, RIGHT: ICD-10-CM

## 2021-08-10 DIAGNOSIS — S46.011A TRAUMATIC COMPLETE TEAR OF RIGHT ROTATOR CUFF, INITIAL ENCOUNTER: ICD-10-CM

## 2021-08-10 RX ORDER — HYDROCODONE BITARTRATE AND ACETAMINOPHEN 7.5; 325 MG/1; MG/1
1 TABLET ORAL DAILY PRN
Qty: 7 TABLET | Refills: 0 | Status: SHIPPED
Start: 2021-08-10 | End: 2021-08-17 | Stop reason: SDUPTHER

## 2021-08-11 ENCOUNTER — TREATMENT (OUTPATIENT)
Dept: PHYSICAL THERAPY | Age: 61
End: 2021-08-11
Payer: COMMERCIAL

## 2021-08-11 DIAGNOSIS — M75.41 SHOULDER IMPINGEMENT, RIGHT: ICD-10-CM

## 2021-08-11 DIAGNOSIS — S46.011A TRAUMATIC COMPLETE TEAR OF RIGHT ROTATOR CUFF, INITIAL ENCOUNTER: Primary | ICD-10-CM

## 2021-08-11 DIAGNOSIS — S46.111A LABRAL TEAR OF LONG HEAD OF RIGHT BICEPS TENDON, INITIAL ENCOUNTER: ICD-10-CM

## 2021-08-11 DIAGNOSIS — M19.011 ARTHRITIS OF RIGHT ACROMIOCLAVICULAR JOINT: ICD-10-CM

## 2021-08-11 PROCEDURE — 97110 THERAPEUTIC EXERCISES: CPT | Performed by: PHYSICAL THERAPIST

## 2021-08-11 NOTE — PROGRESS NOTES
Physical Therapy Daily Treatment Note    Date: 2021  Patient Name: Amanda Coombs  : 1960   MRN: [id number]  DOInjury: ~2/15/21  DOSx: 21     Referring Provider:    MISAEL [facility] [address] [place] [address]       Medical Diagnosis:    Diagnosis Orders   1. Traumatic complete tear of right rotator cuff, initial encounter     2. Labral tear of long head of right biceps tendon, initial encounter     3. Arthritis of right acromioclavicular joint     4. Shoulder impingement, right     OPERATION: (1)right shoulder arthroscopy with arthroscopic rotator cuff   Repair (3-4 cm). (2) Subacromial arch decompression. (3) debridement labrum/biceps (4) noah procedure    Outcome Measure:  QuickDASH (Disorders of the Arm, Shoulder, and Hand) 80% disability    PRINTED  Access Code: [email or web address]  URL: [email or web address]  Date: 2021  Prepared by: Leann Villarreal    Exercises  Circular Shoulder Pendulum with Table Support - 1 x daily - 1 sets - 30 reps  Standing Shoulder Flexion AAROM with Dowel - 1 x daily - 3 sets - 10 reps  Supine Shoulder External Rotation with Dowel - 1 x daily - 3 sets - 10 reps  Standing Shoulder Flexion Full Range - 1 x daily - 3 sets - 10 reps      12 weeks post-op: 2021     S: No new report   O:   Time 0468-9295     Visit  Repeat outcome measure at mid point and end.     Pain Pain 6/10     ROM AROM: 150° Forward elevation,  50° ER,  IR to T10  PROM: 170 flex, 85 ER, 60 IR    Passive mobility is symmetrical as of 21    Modalities         MO   Manual      Stretching ER/Flex  Discontinued   MT         Stretch      Wall Flexion  Discontinued  TE   Wall ER stretch (in 90 ABD)  Discontinued  TE   Towel IR stretch HEP   TE   Exercise             ROWS: H Black 3 x 10-15  TA   ROWS: M Black 3 x 10-15  TA   ROWS: L Black 3 x 10-15  TA   ER  opted for s-lying ER  TE   S-lying ER 1 lbs 3 x 12 (to fatigue)  TE   IR Green 3 x 12  TE Flexion in scapular plane 1 lbs 3 x 15                                   A: Tolerated well   P: Continue with strengthening   Lety Bright, DONNA    Treatment Charges: Mins Units   Initial Evaluation     Re-Evaluation     Ther Exercise         TE 40 3   Manual Therapy     MT     Ther Activities        TA     Gait Training          GT     Neuro Re-education NR     Modalities     Non-Billable Service Time     Other     Total Time/Units 40 3

## 2021-08-16 ENCOUNTER — TREATMENT (OUTPATIENT)
Dept: PHYSICAL THERAPY | Age: 61
End: 2021-08-16
Payer: COMMERCIAL

## 2021-08-16 DIAGNOSIS — M19.011 ARTHRITIS OF RIGHT ACROMIOCLAVICULAR JOINT: ICD-10-CM

## 2021-08-16 DIAGNOSIS — S46.111A LABRAL TEAR OF LONG HEAD OF RIGHT BICEPS TENDON, INITIAL ENCOUNTER: ICD-10-CM

## 2021-08-16 DIAGNOSIS — S46.011A TRAUMATIC COMPLETE TEAR OF RIGHT ROTATOR CUFF, INITIAL ENCOUNTER: Primary | ICD-10-CM

## 2021-08-16 PROCEDURE — 97110 THERAPEUTIC EXERCISES: CPT

## 2021-08-16 NOTE — PROGRESS NOTES
Physical Therapy Daily Treatment Note    Date: 2021  Patient Name: Pavan Brandon  : 1960   MRN: [id number]  DOInjury: ~2/15/21  DOSx: 21     Referring Provider:    Alysha Bee APRN - [address] [facility] [place] [address]       Medical Diagnosis:    Diagnosis Orders   1. Traumatic complete tear of right rotator cuff, initial encounter     2. Labral tear of long head of right biceps tendon, initial encounter     3. Arthritis of right acromioclavicular joint     OPERATION: (1)right shoulder arthroscopy with arthroscopic rotator cuff   Repair (3-4 cm). (2) Subacromial arch decompression. (3) debridement labrum/biceps (4) noah procedure    Outcome Measure:  QuickDASH (Disorders of the Arm, Shoulder, and Hand) 80% disability    PRINTED  Access Code: [email or web address]  URL: [email or web address]  Date: 2021  Prepared by: Jerry Porter    Exercises  Circular Shoulder Pendulum with Table Support - 1 x daily - 1 sets - 30 reps  Standing Shoulder Flexion AAROM with Dowel - 1 x daily - 3 sets - 10 reps  Supine Shoulder External Rotation with Dowel - 1 x daily - 3 sets - 10 reps  Standing Shoulder Flexion Full Range - 1 x daily - 3 sets - 10 reps    12 weeks post-op: 2021     S: Patient reports mild muscle soreness only after exercising but always has \"bone pain\" on top of shoulder. O:   Time [id number]     Visit  Repeat outcome measure at mid point and end.     Pain Pain 6/10     ROM AROM: 150° Forward elevation,  50° ER,  IR to T10  PROM: 170 flex, 85 ER, 60 IR    Passive mobility is symmetrical as of 21    Modalities         MO   Manual      Stretching ER/Flex  Discontinued   MT         Stretch      Wall Flexion  Discontinued  TE   Wall ER stretch (in 90 ABD)  Discontinued  TE   Towel IR stretch HEP   TE   Exercise             ROWS: H Black 3 x 10-15  TA   ROWS: M Black 3 x 10-15  TA   ROWS: L Black 3 x 10-15  TA   ER  opted for s-lying ER  TE   S-lying ER 1 lbs 3 x 12 (to fatigue)  TE   IR Green 3 x 12  TE   Flexion in scapular plane 1 lbs 3 x 15                                   A: Tolerated well.   P: Continue with strengthening   Marixa Lopez PTA    Treatment Charges: Mins Units   Initial Evaluation     Re-Evaluation     Ther Exercise         TE 30 2   Manual Therapy     MT     Ther Activities        TA     Gait Training          GT     Neuro Re-education NR     Modalities     Non-Billable Service Time     Other     Total Time/Units 30 2

## 2021-08-17 DIAGNOSIS — M75.41 SHOULDER IMPINGEMENT, RIGHT: ICD-10-CM

## 2021-08-17 DIAGNOSIS — S46.011A TRAUMATIC COMPLETE TEAR OF RIGHT ROTATOR CUFF, INITIAL ENCOUNTER: ICD-10-CM

## 2021-08-17 RX ORDER — HYDROCODONE BITARTRATE AND ACETAMINOPHEN 7.5; 325 MG/1; MG/1
1 TABLET ORAL DAILY PRN
Qty: 7 TABLET | Refills: 0 | Status: SHIPPED
Start: 2021-08-17 | End: 2021-08-24 | Stop reason: SDUPTHER

## 2021-08-19 ENCOUNTER — OFFICE VISIT (OUTPATIENT)
Dept: ORTHOPEDIC SURGERY | Age: 61
End: 2021-08-19
Payer: COMMERCIAL

## 2021-08-19 VITALS — BODY MASS INDEX: 37.03 KG/M2 | HEIGHT: 63 IN | TEMPERATURE: 98 F | WEIGHT: 209 LBS

## 2021-08-19 DIAGNOSIS — M19.011 ARTHRITIS OF RIGHT ACROMIOCLAVICULAR JOINT: ICD-10-CM

## 2021-08-19 DIAGNOSIS — S46.011A TRAUMATIC COMPLETE TEAR OF RIGHT ROTATOR CUFF, INITIAL ENCOUNTER: ICD-10-CM

## 2021-08-19 DIAGNOSIS — S46.111A LABRAL TEAR OF LONG HEAD OF RIGHT BICEPS TENDON, INITIAL ENCOUNTER: ICD-10-CM

## 2021-08-19 DIAGNOSIS — M75.41 SHOULDER IMPINGEMENT, RIGHT: Primary | ICD-10-CM

## 2021-08-19 PROCEDURE — G8427 DOCREV CUR MEDS BY ELIG CLIN: HCPCS | Performed by: NURSE PRACTITIONER

## 2021-08-19 PROCEDURE — 4004F PT TOBACCO SCREEN RCVD TLK: CPT | Performed by: NURSE PRACTITIONER

## 2021-08-19 PROCEDURE — 99213 OFFICE O/P EST LOW 20 MIN: CPT | Performed by: NURSE PRACTITIONER

## 2021-08-19 PROCEDURE — 3017F COLORECTAL CA SCREEN DOC REV: CPT | Performed by: NURSE PRACTITIONER

## 2021-08-19 PROCEDURE — G8417 CALC BMI ABV UP PARAM F/U: HCPCS | Performed by: NURSE PRACTITIONER

## 2021-08-19 RX ORDER — IBUPROFEN 800 MG/1
800 TABLET ORAL EVERY 8 HOURS PRN
Qty: 90 TABLET | Refills: 3 | Status: SHIPPED
Start: 2021-08-19 | End: 2021-10-08 | Stop reason: CLARIF

## 2021-08-19 NOTE — PROGRESS NOTES
Chief Complaint   Patient presents with    Shoulder Pain     Right shoulder scope follow up DOS 21. Shoulder doing well. Still has days when it is sore. Gus Garsia returns today for follow up of her right shoulder rtc repair [facility] 2021. she reports that the pain in the shoulder is better. she has been going to physical therapy. she is also complaining of intermittent pain . The patient is right dominant. The patient's pain level is a 6/10. The patient did respond to treatment. Past Medical History:   Diagnosis Date    Anxiety     Chronic back pain     Hypertension     Hypothyroidism     Meniere's disease     Osteoarthritis     Unspecified sleep apnea     no c-pap     Past Surgical History:   Procedure Laterality Date     SECTION      CHOLECYSTECTOMY  2012 - Dr. Jabier Lucero, [facility], Laparoscopic     RIGHT COLECTOMY  2014    [place] - laparoscopic right colectomy \" purpose\": intestines flipped\"    TAMMIE-EN-Y GASTRIC BYPASS  2010 - Laparoscopic    SHOULDER ARTHROSCOPY Right 2021    RIGHT SHOULDER ARTHROSCOPY, ROTATOR CUFF REPAIR, SUBACROMIAL DECOMPRESSION AND DEBRIDEMENT (CPT 66845) TONY(ARTHREX)DEBRIDEMENT OF LABRUM , DEBRIDEMENT OF BICEPS performed by Carmen Hamm DO at [address] Right 2021    5 ANCHORS    TUBAL LIGATION      UPPER GASTROINTESTINAL ENDOSCOPY         Current Outpatient Medications:     HYDROcodone-acetaminophen (NORCO) 7.5-325 MG per tablet, Take 1 tablet by mouth daily as needed for Pain for up to 7 days. Intended supply: 7 days.  Take lowest dose possible to manage pain, Disp: 7 tablet, Rfl: 0    levothyroxine (SYNTHROID) 50 MCG tablet, TAKE 1 TABLET BY MOUTH DAILY, Disp: 90 tablet, Rfl: 1    fluticasone (FLONASE) 50 MCG/ACT nasal spray, SHAKE LIQUID AND USE 1 SPRAY IN EACH NOSTRIL DAILY, Disp: 16 g, Rfl: 2    ibuprofen (ADVIL;MOTRIN) 800 MG tablet, TAKE 1 TABLET BY MOUTH THREE TIMES DAILY WITH MEALS AS NEEDED FOR PAIN, Disp: , Rfl:     hydroCHLOROthiazide (HYDRODIURIL) 25 MG tablet, Take 1 tablet by mouth every morning, Disp: 30 tablet, Rfl: 5    calcium carbonate (OSCAL) 500 MG TABS tablet, Take 1,000 mg by mouth daily, Disp: , Rfl:     ferrous sulfate 325 (65 Fe) MG tablet, Take 325 mg by mouth daily (with breakfast), Disp: , Rfl:     Multiple Vitamin (MULTIVITAMIN PO), Take 1 tablet by mouth 2 times daily. Ultra Rodrick , Disp: , Rfl:   Allergies   Allergen Reactions    Meloxicam      Severe stomach pain    Tramadol Other (See Comments)     Makes blisters in mouth     Social History     Socioeconomic History    Marital status:      Spouse name: Not on file    Number of children: Not on file    Years of education: Not on file    Highest education level: Not on file   Occupational History    Not on file   Tobacco Use    Smoking status: Current Every Day Smoker     Packs/day: 1.00     Years: 7.00     Pack years: 7.00     Types: Cigarettes     Start date: 1/1/2012    Smokeless tobacco: Never Used    Tobacco comment: Previously smoked for 8 years, then quit for 15 years, then started again   Vaping Use    Vaping Use: Never used   Substance and Sexual Activity    Alcohol use: No    Drug use: Never    Sexual activity: Not Currently   Other Topics Concern    Not on file   Social History Narrative    Not on file     Social Determinants of Health     Financial Resource Strain:     Difficulty of Paying Living Expenses:    Food Insecurity:     Worried About [address] in the Last Year:     [address] in the Last Year:    Transportation Needs:     Lack of Transportation (Medical):      Lack of Transportation (Non-Medical):    Physical Activity:     Days of Exercise per Week:     Minutes of Exercise per Session:    Stress:     Feeling of Stress :    Social Connections:     Frequency of Communication with Friends and Family:     Frequency of Social Gatherings with Friends and Family:     Attends Congregation Services:     Active Member of Clubs or Organizations:     Attends Club or Organization Meetings:     Marital Status:    Intimate Partner Violence:     Fear of Current or Ex-Partner:     Emotionally Abused:     Physically Abused:     Sexually Abused:      Family History   Problem Relation Age of Onset    High Blood Pressure Mother     Heart Disease Mother     Obesity Mother     High Blood Pressure Father     High Blood Pressure Brother     Cancer Brother     Obesity Brother        REVIEW OF SYSTEMS:     General/Constitution:  (-)weight loss, (-)fever, (-)chills, (-)weakness. Skin: (-) rash,(-) psoriasis,(-) eczema, (-)skin cancer. Musculoskeletal: (-) fractures,  (-) dislocations,(-) collagen vascular disease, (-) fibromyalgia, (-) multiple sclerosis, (-) muscular dystrophy, (-) RSD,(-) joint pain (-)swelling, (-) joint pain,swelling. Neurologic: (-) epilepsy, (-)seizures,(-) brain tumor,(-) TIA, (-)stroke, (-)headaches, (-)Parkinson disease,(-) memory loss, (-) LOC. Cardiovascular: (-) Chest pain, (-) swelling in legs/feet, (-) SOB, (-) cramping in legs/feet with walking. Respiratory: (-) SOB, (-) Coughing, (-) night sweats. GI: (-) nausea, (-) vomiting, (-) diarrhea, (-) blood in stool, (-) gastric ulcer. Psychiatric: (-) Depression, (-) Anxiety, (-) bipolar disease, (-) Alzheimer's Disease  Allergic/Immunologic: (-) allergies latex, (-) allergies metal, (-) skin sensitivity. Hematlogic: (-) anemia, (-) blood transfusion, (-) DVT/PE, (-) Clotting disorders    SUBJECTIVE:    Constitution:  The patient is alert and oriented x 3, appears to be stated age and in no distress. Temp 98 °F (36.7 °C)   Ht 5' 2.5\" (1.588 m)   Wt 209 lb (94.8 kg)   LMP  (LMP Unknown)   BMI 37.62 kg/m²       Skin:  Upon inspection: the skin appears warm, dry and intact. There is  a previous scar over the affected area. There is not any cellulitis, lymphedema or cutaneous lesions noted in the lower extremities. Upon palpation there is no induration noted. Neurologic:  Gait: normal;  Motor exam of the upper extremities show: The reflexes in biceps/triceps/brachioradialis are equal and symmetric. Sensory exam C5-T1 are normal bilaterally. Cardiovascular: The vascular exam is normal and is well perfused to distal extremities. There are 2+ radial pulses bilaterally, and motor and sensation is intact to median, ulnar, and radial, musclocutaneus, and axillary nerve distribution and grossly symmetric bilaterally. There is cap refill noted less than two seconds in all digits. There is not edema of the bilateral upper extremities. There is not varicosities noted in the distal extremities. Lymph:  Upon palpation,  there is no lymphadenopathy noted in bilateral upper extremities. Musculoskeletal:  Gait: normal; examination of the nails and digits reveal no cyanosis or clubbing. Cervical Exam:  On physical exam, Sonam Valentin is well-developed, well-nourished, oriented to person, place and time. her gait is normal.  On evaluation of her cervical spine, she has full range of motion of the cervical spine without pain. There is no cervical tenderness to palpation. Shoulder Exam:   On evaluation of her bilaterally upper extremities, her right shoulder has no deformity. There is tenderness upon palpation of the ac joint. There is not evidence of scapular dyskinesis. There is not muscle atrophy in shoulder girdle. The range of motion for the Right Shoulder is 140/30/t12 and for the Left shoulder is 150/45/t8. Right shoulder Motor strength is 5-/5 in the supraspinatus, 5/5 internal rotation and 5-/5 in external rotation, and Left shoulder motor strength 5/5 in supraspinatus, 5/5 in internal rotation, 5/5 in external rotation.         Right shoulder:  negative Impingement , negative [place] ,negative  Speeds,negative  Apprehension ,negative Vasquez Load Shift, negative Sam manuver, positive Cross arm test.     Left shoulder:  negative Impingement , negative [place] ,negative  Speeds,negative  Apprehension ,negative Vasquez Load Shift, negative Sam manuver, negative Cross arm test.     X-ray:  n/a    MRI:  n/a    Radiographic findings reviewed with patient        Impression:       Encounter Diagnoses   Name Primary?  Shoulder impingement, right Yes    Traumatic complete tear of right rotator cuff, initial encounter     Arthritis of right acromioclavicular joint     Labral tear of long head of right biceps tendon, initial encounter        Plan:  Natural history and expected course discussed. Questions answered. Educational material distributed. Reduction in offending activity.   Gentle ROM exercises  Continue pt  Hep  Ibuprofen 800 tid prn  Fu in 2 months

## 2021-08-24 DIAGNOSIS — M75.41 SHOULDER IMPINGEMENT, RIGHT: ICD-10-CM

## 2021-08-24 DIAGNOSIS — S46.011A TRAUMATIC COMPLETE TEAR OF RIGHT ROTATOR CUFF, INITIAL ENCOUNTER: ICD-10-CM

## 2021-08-24 RX ORDER — HYDROCODONE BITARTRATE AND ACETAMINOPHEN 7.5; 325 MG/1; MG/1
1 TABLET ORAL DAILY PRN
Qty: 7 TABLET | Refills: 0 | Status: SHIPPED | OUTPATIENT
Start: 2021-08-24 | End: 2021-08-31

## 2021-08-24 NOTE — TELEPHONE ENCOUNTER
Refills have been requested for the following medications:         HYDROcodone-acetaminophen (NORCO) 7.5-325 MG per tablet Ryder Moise, MISAEL - CNP]     [facility]  [phone]    DOS 4/29/2021  Last refill: 8/17/2021

## 2021-08-26 ENCOUNTER — TELEPHONE (OUTPATIENT)
Dept: PHYSICAL THERAPY | Age: 61
End: 2021-08-26

## 2021-08-26 NOTE — TELEPHONE ENCOUNTER
patient cx pt treatments this week-had to go out of town for an emergency.   rescheduled for next week

## 2021-08-31 ENCOUNTER — TREATMENT (OUTPATIENT)
Dept: PHYSICAL THERAPY | Age: 61
End: 2021-08-31
Payer: COMMERCIAL

## 2021-08-31 DIAGNOSIS — S46.011A TRAUMATIC COMPLETE TEAR OF RIGHT ROTATOR CUFF, INITIAL ENCOUNTER: Primary | ICD-10-CM

## 2021-08-31 DIAGNOSIS — S46.011A TRAUMATIC COMPLETE TEAR OF RIGHT ROTATOR CUFF, INITIAL ENCOUNTER: ICD-10-CM

## 2021-08-31 DIAGNOSIS — M75.41 SHOULDER IMPINGEMENT, RIGHT: ICD-10-CM

## 2021-08-31 PROCEDURE — 97110 THERAPEUTIC EXERCISES: CPT

## 2021-08-31 RX ORDER — HYDROCODONE BITARTRATE AND ACETAMINOPHEN 7.5; 325 MG/1; MG/1
1 TABLET ORAL DAILY PRN
Qty: 7 TABLET | Refills: 0 | OUTPATIENT
Start: 2021-08-31 | End: 2021-09-07

## 2021-08-31 NOTE — PROGRESS NOTES
Physical Therapy Daily Treatment Note    Date: 2021  Patient Name: Matty Howell  : 1960   MRN: [id number]  DOInjury: ~2/15/21  DOSx: 21     Referring Provider:    Jose Benites, APRN - [address] [place] [address]       Medical Diagnosis:    Diagnosis Orders   1. Traumatic complete tear of right rotator cuff, initial encounter     OPERATION: (1)right shoulder arthroscopy with arthroscopic rotator cuff   Repair (3-4 cm). (2) Subacromial arch decompression. (3) debridement labrum/biceps (4) noah procedure    Outcome Measure:  QuickDASH (Disorders of the Arm, Shoulder, and Hand) 80% disability    PRINTED  Access Code: [email or web address]  URL: [email or web address]  Date: 2021  Prepared by: Sarika Mendez    Exercises  Circular Shoulder Pendulum with Table Support - 1 x daily - 1 sets - 30 reps  Standing Shoulder Flexion AAROM with Dowel - 1 x daily - 3 sets - 10 reps  Supine Shoulder External Rotation with Dowel - 1 x daily - 3 sets - 10 reps  Standing Shoulder Flexion Full Range - 1 x daily - 3 sets - 10 reps    12 weeks post-op: 2021     S: Patient continues to have \"bone pain\" on top of shoulder. Explained to patient the extent of the procedures performed where bone pain can persist months after. O:   Time 8810-0884     Visit 15/18 Repeat outcome measure at mid point and end.     Pain Pain 6/10     ROM AROM: 150° Forward elevation,  50° ER,  IR to T10  PROM: 170 flex, 85 ER, 60 IR    Passive mobility is symmetrical as of 21    Modalities         MO   Manual      Stretching ER/Flex  Discontinued   MT         Stretch      Wall Flexion  Discontinued  TE   Wall ER stretch (in 90 ABD)  Discontinued  TE   Towel IR stretch HEP   TE   Exercise             ROWS: H Black 3 x 10-15  TA   ROWS: M Black 3 x 10-15  TA   ROWS: L Black 3 x 10-15  TA   ER  opted for s-lying ER  TE   S-lying ER 2 lbs 3 x 12 (to fatigue) Continue to increase weight when tolerated TE IR Green 3 x 12  TE   Flexion in scapular plane 2 lbs 3 x 15 Continue to increase weight when tolerated                                  A: Tolerated fair due to increased weight for above exercises where she had increased soreness. Encouraged to ice when she gets home and as needed throughout day.    P: Continue with strengthening   Marixa Lopez PTA    Treatment Charges: Mins Units   Initial Evaluation     Re-Evaluation     Ther Exercise         TE 45 3   Manual Therapy     MT     Ther Activities        TA     Gait Training          GT     Neuro Re-education NR     Modalities     Non-Billable Service Time     Other     Total Time/Units 45 3

## 2021-09-08 ENCOUNTER — TREATMENT (OUTPATIENT)
Dept: PHYSICAL THERAPY | Age: 61
End: 2021-09-08
Payer: COMMERCIAL

## 2021-09-08 DIAGNOSIS — S46.011A TRAUMATIC COMPLETE TEAR OF RIGHT ROTATOR CUFF, INITIAL ENCOUNTER: Primary | ICD-10-CM

## 2021-09-08 PROCEDURE — 97110 THERAPEUTIC EXERCISES: CPT

## 2021-09-08 NOTE — PROGRESS NOTES
Physical Therapy Daily Treatment Note    Date: 2021  Patient Name: Mel Kwok  : 1960   MRN: [id number]  DOInjury: ~2/15/21  DOSx: 21     Referring Provider:    MISAEL [place] - [facility] [address] [place] [address]       Medical Diagnosis:    Diagnosis Orders   1. Traumatic complete tear of right rotator cuff, initial encounter     OPERATION: (1)right shoulder arthroscopy with arthroscopic rotator cuff   Repair (3-4 cm). (2) Subacromial arch decompression. (3) debridement labrum/biceps (4) [place] procedure    Outcome Measure:  QuickDASH (Disorders of the Arm, Shoulder, and Hand) 80% disability    PRINTED  Access Code: [email or web address]  URL: [email or web address]  Date: 2021  Prepared by: Casper Naranjo    Exercises  Circular Shoulder Pendulum with Table Support - 1 x daily - 1 sets - 30 reps  Standing Shoulder Flexion AAROM with Dowel - 1 x daily - 3 sets - 10 reps  Supine Shoulder External Rotation with Dowel - 1 x daily - 3 sets - 10 reps  Standing Shoulder Flexion Full Range - 1 x daily - 3 sets - 10 reps    12 weeks post-op: 2021     S: Patient continues to have \"bone pain\" on top of shoulder. Explained to patient the extent of the procedures performed where bone pain can persist months after. O:   Time 4581-1907     Visit  Repeat outcome measure at mid point and end.     Pain Pain 6/10     ROM AROM: 150° Forward elevation,  50° ER,  IR to T10  PROM: 170 flex, 85 ER, 60 IR    Passive mobility is symmetrical as of 21    Modalities         MO   Manual      Stretching ER/Flex  Discontinued   MT         Stretch      Wall Flexion  Discontinued  TE   Wall ER stretch (in 90 ABD)  Discontinued  TE   Towel IR stretch HEP   TE   Exercise             ROWS: H Black 3 x 10-15  TA   ROWS: M Black 3 x 10-15  TA   ROWS: L Black 3 x 10-15  TA   ER  opted for s-lying ER  TE   S-lying ER  1 lb 3 x 12 (to fatigue) Continue to increase weight when tolerated TE IR Green 3 x 12  TE   Flexion in scapular plane 2 lbs 3 x 15 Continue to increase weight when tolerated                                  A: Tolerated fair due to increased weight for above exercises where she had increased soreness. Encouraged to ice when she gets home and as needed throughout day.    P: Continue with strengthening   Marixa Lopez PTA    Treatment Charges: Mins Units   Initial Evaluation     Re-Evaluation     Ther Exercise         TE 38 3   Manual Therapy     MT     Ther Activities        TA     Gait Training          GT     Neuro Re-education NR     Modalities     Non-Billable Service Time     Other     Total Time/Units 38 3

## 2021-09-22 DIAGNOSIS — I10 ESSENTIAL HYPERTENSION: ICD-10-CM

## 2021-09-22 RX ORDER — HYDROCHLOROTHIAZIDE 25 MG/1
25 TABLET ORAL EVERY MORNING
Qty: 90 TABLET | Refills: 0 | Status: SHIPPED | OUTPATIENT
Start: 2021-09-22

## 2021-10-08 ENCOUNTER — VIRTUAL VISIT (OUTPATIENT)
Dept: FAMILY MEDICINE CLINIC | Age: 61
End: 2021-10-08
Payer: COMMERCIAL

## 2021-10-08 DIAGNOSIS — R06.02 SHORTNESS OF BREATH: ICD-10-CM

## 2021-10-08 DIAGNOSIS — R05.9 COUGH: ICD-10-CM

## 2021-10-08 DIAGNOSIS — H81.03 MENIERE'S DISEASE OF BOTH EARS: ICD-10-CM

## 2021-10-08 DIAGNOSIS — B96.89 ACUTE BACTERIAL SINUSITIS: Primary | ICD-10-CM

## 2021-10-08 DIAGNOSIS — J01.90 ACUTE BACTERIAL SINUSITIS: Primary | ICD-10-CM

## 2021-10-08 PROCEDURE — 4004F PT TOBACCO SCREEN RCVD TLK: CPT | Performed by: FAMILY MEDICINE

## 2021-10-08 PROCEDURE — 99213 OFFICE O/P EST LOW 20 MIN: CPT | Performed by: FAMILY MEDICINE

## 2021-10-08 PROCEDURE — 3017F COLORECTAL CA SCREEN DOC REV: CPT | Performed by: FAMILY MEDICINE

## 2021-10-08 PROCEDURE — G8484 FLU IMMUNIZE NO ADMIN: HCPCS | Performed by: FAMILY MEDICINE

## 2021-10-08 PROCEDURE — G8417 CALC BMI ABV UP PARAM F/U: HCPCS | Performed by: FAMILY MEDICINE

## 2021-10-08 PROCEDURE — G8427 DOCREV CUR MEDS BY ELIG CLIN: HCPCS | Performed by: FAMILY MEDICINE

## 2021-10-08 RX ORDER — METHYLPREDNISOLONE 4 MG/1
TABLET ORAL
Qty: 1 KIT | Refills: 0 | Status: SHIPPED | OUTPATIENT
Start: 2021-10-08 | End: 2021-10-14

## 2021-10-08 RX ORDER — AZITHROMYCIN 250 MG/1
250 TABLET, FILM COATED ORAL SEE ADMIN INSTRUCTIONS
Qty: 6 TABLET | Refills: 0 | Status: SHIPPED | OUTPATIENT
Start: 2021-10-08 | End: 2021-10-13

## 2021-10-08 RX ORDER — ALBUTEROL SULFATE 90 UG/1
2 AEROSOL, METERED RESPIRATORY (INHALATION) EVERY 6 HOURS PRN
Qty: 18 G | Refills: 3 | Status: SHIPPED | OUTPATIENT
Start: 2021-10-08

## 2021-10-08 RX ORDER — BENZONATATE 100 MG/1
100 CAPSULE ORAL 2 TIMES DAILY PRN
Qty: 20 CAPSULE | Refills: 0 | Status: SHIPPED | OUTPATIENT
Start: 2021-10-08 | End: 2021-10-18

## 2021-10-08 SDOH — ECONOMIC STABILITY: FOOD INSECURITY: WITHIN THE PAST 12 MONTHS, THE FOOD YOU BOUGHT JUST DIDN'T LAST AND YOU DIDN'T HAVE MONEY TO GET MORE.: NEVER TRUE

## 2021-10-08 SDOH — ECONOMIC STABILITY: FOOD INSECURITY: WITHIN THE PAST 12 MONTHS, YOU WORRIED THAT YOUR FOOD WOULD RUN OUT BEFORE YOU GOT MONEY TO BUY MORE.: NEVER TRUE

## 2021-10-08 ASSESSMENT — SOCIAL DETERMINANTS OF HEALTH (SDOH): HOW HARD IS IT FOR YOU TO PAY FOR THE VERY BASICS LIKE FOOD, HOUSING, MEDICAL CARE, AND HEATING?: NOT HARD AT ALL

## 2021-10-14 PROBLEM — R06.02 SHORTNESS OF BREATH: Status: ACTIVE | Noted: 2021-10-14

## 2021-10-14 PROBLEM — R05.9 COUGH: Status: ACTIVE | Noted: 2021-10-14

## 2021-10-14 ASSESSMENT — ENCOUNTER SYMPTOMS
RHINORRHEA: 0
COUGH: 1
VOICE CHANGE: 0
ALLERGIC/IMMUNOLOGIC NEGATIVE: 1
BLOOD IN STOOL: 0
CHEST TIGHTNESS: 0
COLOR CHANGE: 0
CHOKING: 0
VOMITING: 0
SINUS PRESSURE: 1
STRIDOR: 0
ANAL BLEEDING: 0
WHEEZING: 0
DIARRHEA: 0
ABDOMINAL PAIN: 0
EYE REDNESS: 0
PHOTOPHOBIA: 0
NAUSEA: 0
SORE THROAT: 0
EYE PAIN: 0
EYES NEGATIVE: 1
CONSTIPATION: 0
ABDOMINAL DISTENTION: 0
BACK PAIN: 0
RECTAL PAIN: 0
TROUBLE SWALLOWING: 0
EYE DISCHARGE: 0
EYE ITCHING: 0
FACIAL SWELLING: 0
SHORTNESS OF BREATH: 1
SINUS PAIN: 1

## 2021-10-14 NOTE — PROGRESS NOTES
Amber Romero is a 64 y.o. female. HPI/Chief C/O:  Chief Complaint   Patient presents with    Dizziness     Ménière's disease    Cough     Cough with production    Shortness of Breath    Other     Pt consents to the telemedicine visit and is currently in [place]     Allergies   Allergen Reactions    Meloxicam      Severe stomach pain    Tramadol Other (See Comments)     Makes blisters in mouth     This 64year old female presents with nasal congestion, cough, sinus pain and pressure, and vertigo. Pt has slight shortness of breath with exertion. Pt denies chest pain and denies fever. TeleMedicine Video Visit    Torrance Nyhan, was evaluated through a synchronous (real-time) audio-video encounter. The patient (or guardian if applicable) is aware that this is a billable service. Verbal consent to proceed has been obtained within the past 12 months. The visit was conducted pursuant to the emergency declaration under the [address] [place] [address], [address] [place] [address] authority and the [employer] Act. Patient identification was verified, and a caregiver was present when appropriate. The patient was located in a state where the provider was credentialed to provide care. Patient identification was verified at the start of the visit, including the patient's telephone number and physical location. I discussed with the patient the nature of our telehealth visits, that:     1. Due to the nature of an audio- video modality, the only components of a physical exam that could be done are the elements supported by direct observation. 2. I would evaluate the patient and recommend diagnostics and treatments based on my assessment. 3. If it was felt that the patient should be evaluated in clinic or an emergency room setting, then they would be directed there.   4. Our sessions are not being recorded and that personal health information is protected. 5. Our team would provide follow up care in person if/when the patient needs it. Patient's location: home address in [facility]  Physician  location other address in [facility] other people involved in call  Dr. Reina Cloud       On this date 10/8/2021 I have spent 25  minutes reviewing previous notes, test results and face to face (virtual) with the patient discussing the diagnosis and importance of compliance with the treatment plan as well as documenting on the day of the visit. This visit was completed virtually using Doxy. me      ROS:  Review of Systems   Constitutional: Positive for fatigue. Negative for activity change, appetite change, chills, diaphoresis, fever and unexpected weight change. HENT: Positive for congestion, sinus pressure and sinus pain. Negative for dental problem, drooling, ear discharge, ear pain, facial swelling, hearing loss, mouth sores, nosebleeds, postnasal drip, rhinorrhea, sneezing, sore throat, tinnitus, trouble swallowing and voice change. Eyes: Negative. Negative for photophobia, pain, discharge, redness, itching and visual disturbance. Respiratory: Positive for cough and shortness of breath. Negative for choking, chest tightness, wheezing and stridor. Cardiovascular: Negative. Negative for chest pain, palpitations and leg swelling. Gastrointestinal: Negative for abdominal distention, abdominal pain, anal bleeding, blood in stool, constipation, diarrhea, nausea, rectal pain and vomiting. Endocrine: Negative. Negative for cold intolerance, heat intolerance, polydipsia, polyphagia and polyuria. Genitourinary: Negative. Negative for decreased urine volume, difficulty urinating, dysuria, flank pain, frequency, genital sores, hematuria, menstrual problem, pelvic pain and urgency. Musculoskeletal: Positive for arthralgias and myalgias. Negative for back pain, gait problem, joint swelling, neck pain and neck stiffness. Skin: Negative.   Negative for color change, pallor, rash and wound. Allergic/Immunologic: Negative. Neurological: Positive for dizziness. Negative for tremors, seizures, syncope, facial asymmetry, speech difficulty, weakness, light-headedness, numbness and headaches. Hematological: Negative. Negative for adenopathy. Does not bruise/bleed easily. Psychiatric/Behavioral: Positive for sleep disturbance. Negative for agitation, behavioral problems, confusion, decreased concentration, dysphoric mood, hallucinations, self-injury and suicidal ideas. The patient is nervous/anxious. The patient is not hyperactive.          Past Medical/Surgical Hx;  Reviewed with patient      Diagnosis Date    Anxiety     Chronic back pain     Hypertension     Hypothyroidism     Meniere's disease     Osteoarthritis     Unspecified sleep apnea     no c-pap     Past Surgical History:   Procedure Laterality Date     SECTION      CHOLECYSTECTOMY  2012 - Dr. Nelly Braden, [facility], Laparoscopic     RIGHT COLECTOMY  2014    Algis Miracle - laparoscopic right colectomy \" purpose\": intestines flipped\"    TAMMIE-EN-Y GASTRIC BYPASS  2010 - Laparoscopic    SHOULDER ARTHROSCOPY Right 2021    RIGHT SHOULDER ARTHROSCOPY, ROTATOR CUFF REPAIR, SUBACROMIAL DECOMPRESSION AND DEBRIDEMENT (CPT 23216) TONY(ARTHREX)DEBRIDEMENT OF LABRUM , DEBRIDEMENT OF BICEPS performed by Kiara Mancilla DO at [address] [facility] Right 2021    5 ANCHORS    TUBAL LIGATION      UPPER GASTROINTESTINAL ENDOSCOPY         Past Family Hx:  Reviewed with patient      Problem Relation Age of Onset    High Blood Pressure Mother     Heart Disease Mother     Obesity Mother     High Blood Pressure Father     High Blood Pressure Brother     Cancer Brother     Obesity Brother        Social Hx:  Reviewed with patient  Social History     Tobacco Use    Smoking status: Current Every Day Smoker     Packs/day: 1.00     Years: 7.00     Pack years: 7.00 Types: Cigarettes     Start date: 1/1/2012    Smokeless tobacco: Never Used    Tobacco comment: Previously smoked for 8 years, then quit for 15 years, then started again   Substance Use Topics    Alcohol use: No       OBJECTIVE  LMP  (LMP Unknown)     Problem List:  Emelina Ricketts does not have any pertinent problems on file. PHYS EX:  Pt has nasal congestion, and cough. Pt is stable    ASSESSMENT/PLAN  Emelina Ricketts was seen today for dizziness, cough, shortness of breath and other. Diagnoses and all orders for this visit:    Acute bacterial sinusitis  -     azithromycin (ZITHROMAX) 250 MG tablet; Take 1 tablet by mouth See Admin Instructions for 5 days 500mg on day 1 followed by 250mg on days 2 - 5  -     benzonatate (TESSALON) 100 MG capsule; Take 1 capsule by mouth 2 times daily as needed for Cough  -     methylPREDNISolone (MEDROL DOSEPACK) 4 MG tablet; Take as directed  Not controlled. Meniere's disease of both ears  -     azithromycin (ZITHROMAX) 250 MG tablet; Take 1 tablet by mouth See Admin Instructions for 5 days 500mg on day 1 followed by 250mg on days 2 - 5  -     benzonatate (TESSALON) 100 MG capsule; Take 1 capsule by mouth 2 times daily as needed for Cough  -     methylPREDNISolone (MEDROL DOSEPACK) 4 MG tablet; Take as directed  Not controlled. Shortness of breath  -     benzonatate (TESSALON) 100 MG capsule; Take 1 capsule by mouth 2 times daily as needed for Cough  -     methylPREDNISolone (MEDROL DOSEPACK) 4 MG tablet; Take as directed  -     albuterol sulfate  (90 Base) MCG/ACT inhaler; Inhale 2 puffs into the lungs every 6 hours as needed for Wheezing or Shortness of Breath  Stable. Cough  Not controlled. Tessalon, medrol.      Please take tylenol every 6 hours as needed for temperature or aches   Hydrate with 40 to 50 oz fluids   I have sent in medication for you  Please stay in touch and let me know how you are doing  If you get worse, please call asap  You should self isolate     Pt instructed if any worse go ED ASAP. Outpatient Encounter Medications as of 10/8/2021   Medication Sig Dispense Refill    [] azithromycin (ZITHROMAX) 250 MG tablet Take 1 tablet by mouth See Admin Instructions for 5 days 500mg on day 1 followed by 250mg on days 2 - 5 6 tablet 0    benzonatate (TESSALON) 100 MG capsule Take 1 capsule by mouth 2 times daily as needed for Cough 20 capsule 0    methylPREDNISolone (MEDROL DOSEPACK) 4 MG tablet Take as directed 1 kit 0    albuterol sulfate  (90 Base) MCG/ACT inhaler Inhale 2 puffs into the lungs every 6 hours as needed for Wheezing or Shortness of Breath 18 g 3    hydroCHLOROthiazide (HYDRODIURIL) 25 MG tablet TAKE 1 TABLET BY MOUTH EVERY MORNING 90 tablet 0    levothyroxine (SYNTHROID) 50 MCG tablet TAKE 1 TABLET BY MOUTH DAILY 90 tablet 1    ibuprofen (ADVIL;MOTRIN) 800 MG tablet TAKE 1 TABLET BY MOUTH THREE TIMES DAILY WITH MEALS AS NEEDED FOR PAIN      calcium carbonate (OSCAL) 500 MG TABS tablet Take 1,000 mg by mouth daily      ferrous sulfate 325 (65 Fe) MG tablet Take 325 mg by mouth daily (with breakfast)      Multiple Vitamin (MULTIVITAMIN PO) Take 1 tablet by mouth 2 times daily. Ultra Rodrick       [DISCONTINUED] ibuprofen (ADVIL;MOTRIN) 800 MG tablet Take 1 tablet by mouth every 8 hours as needed for Pain 90 tablet 3    [DISCONTINUED] fluticasone (FLONASE) 50 MCG/ACT nasal spray SHAKE LIQUID AND USE 1 SPRAY IN EACH NOSTRIL DAILY 16 g 2     No facility-administered encounter medications on file as of 10/8/2021. No follow-ups on file.         Reviewed recent labs related to Dolores's current problems      Discussed importance of regular Health Maintenance follow up  Health Maintenance   Topic    Hepatitis C screen     Pneumococcal 0-64 years Vaccine (1 of 2 - PPSV23)    HIV screen     DTaP/Tdap/Td vaccine (1 - Tdap)    Cervical cancer screen     Shingles Vaccine (1 of 2)    Flu vaccine (1)    A1C test (Diabetic or Prediabetic)     TSH testing     Potassium monitoring     Creatinine monitoring     Breast cancer screen     Lipid screen     Colon cancer screen colonoscopy     COVID-19 Vaccine     Hepatitis A vaccine     Hepatitis B vaccine     Hib vaccine     Meningococcal (ACWY) vaccine

## 2021-10-18 ENCOUNTER — VIRTUAL VISIT (OUTPATIENT)
Dept: FAMILY MEDICINE CLINIC | Age: 61
End: 2021-10-18
Payer: COMMERCIAL

## 2021-10-18 DIAGNOSIS — J01.90 ACUTE BACTERIAL SINUSITIS: Primary | ICD-10-CM

## 2021-10-18 DIAGNOSIS — B96.89 ACUTE BACTERIAL SINUSITIS: Primary | ICD-10-CM

## 2021-10-18 DIAGNOSIS — H81.02 MENIERE'S DISEASE OF LEFT EAR: ICD-10-CM

## 2021-10-18 PROCEDURE — 4004F PT TOBACCO SCREEN RCVD TLK: CPT | Performed by: FAMILY MEDICINE

## 2021-10-18 PROCEDURE — 3017F COLORECTAL CA SCREEN DOC REV: CPT | Performed by: FAMILY MEDICINE

## 2021-10-18 PROCEDURE — G8427 DOCREV CUR MEDS BY ELIG CLIN: HCPCS | Performed by: FAMILY MEDICINE

## 2021-10-18 PROCEDURE — 99213 OFFICE O/P EST LOW 20 MIN: CPT | Performed by: FAMILY MEDICINE

## 2021-10-18 PROCEDURE — G8484 FLU IMMUNIZE NO ADMIN: HCPCS | Performed by: FAMILY MEDICINE

## 2021-10-18 PROCEDURE — G8417 CALC BMI ABV UP PARAM F/U: HCPCS | Performed by: FAMILY MEDICINE

## 2021-10-18 RX ORDER — METHYLPREDNISOLONE 4 MG/1
TABLET ORAL
Qty: 1 KIT | Refills: 0 | Status: SHIPPED | OUTPATIENT
Start: 2021-10-18 | End: 2021-10-24

## 2021-10-18 RX ORDER — DOXYCYCLINE HYCLATE 100 MG
100 TABLET ORAL 2 TIMES DAILY
Qty: 14 TABLET | Refills: 0 | Status: SHIPPED
Start: 2021-10-18 | End: 2021-10-22 | Stop reason: SINTOL

## 2021-10-21 ENCOUNTER — OFFICE VISIT (OUTPATIENT)
Dept: ORTHOPEDIC SURGERY | Age: 61
End: 2021-10-21
Payer: COMMERCIAL

## 2021-10-21 VITALS — WEIGHT: 209 LBS | BODY MASS INDEX: 37.03 KG/M2 | TEMPERATURE: 98 F | HEIGHT: 63 IN

## 2021-10-21 DIAGNOSIS — M75.41 SHOULDER IMPINGEMENT, RIGHT: Primary | ICD-10-CM

## 2021-10-21 DIAGNOSIS — S46.111A LABRAL TEAR OF LONG HEAD OF RIGHT BICEPS TENDON, INITIAL ENCOUNTER: ICD-10-CM

## 2021-10-21 DIAGNOSIS — M19.011 ARTHRITIS OF RIGHT ACROMIOCLAVICULAR JOINT: ICD-10-CM

## 2021-10-21 DIAGNOSIS — S46.011A TRAUMATIC COMPLETE TEAR OF RIGHT ROTATOR CUFF, INITIAL ENCOUNTER: ICD-10-CM

## 2021-10-21 PROCEDURE — G8427 DOCREV CUR MEDS BY ELIG CLIN: HCPCS | Performed by: NURSE PRACTITIONER

## 2021-10-21 PROCEDURE — G8417 CALC BMI ABV UP PARAM F/U: HCPCS | Performed by: NURSE PRACTITIONER

## 2021-10-21 PROCEDURE — 3017F COLORECTAL CA SCREEN DOC REV: CPT | Performed by: NURSE PRACTITIONER

## 2021-10-21 PROCEDURE — 4004F PT TOBACCO SCREEN RCVD TLK: CPT | Performed by: NURSE PRACTITIONER

## 2021-10-21 PROCEDURE — 99212 OFFICE O/P EST SF 10 MIN: CPT | Performed by: NURSE PRACTITIONER

## 2021-10-21 PROCEDURE — G8484 FLU IMMUNIZE NO ADMIN: HCPCS | Performed by: NURSE PRACTITIONER

## 2021-10-21 NOTE — PROGRESS NOTES
Chief Complaint   Patient presents with    Shoulder Pain     Right Shoulder Arthroscopy DOS 2021, F/U       Bakari Hagan returns today for follow up of her right shoulder rtc repair [facility] 2021. she reports that the pain in the shoulder is better. she has completed  physical therapy. The patient is right dominant. The patient's pain level is a 2/10. The patient did respond to treatment.     Past Medical History:   Diagnosis Date    Anxiety     Chronic back pain     Hypertension     Hypothyroidism     Meniere's disease     Osteoarthritis     Unspecified sleep apnea     no c-pap     Past Surgical History:   Procedure Laterality Date     SECTION      CHOLECYSTECTOMY  2012 - Dr. Janet Manriquez, [facility], Laparoscopic     RIGHT COLECTOMY  2014    Tanbárbara Boatengr - laparoscopic right colectomy \" purpose\": intestines flipped\"    TAMMIE-EN-Y GASTRIC BYPASS  2010 - Laparoscopic    SHOULDER ARTHROSCOPY Right 2021    RIGHT SHOULDER ARTHROSCOPY, ROTATOR CUFF REPAIR, SUBACROMIAL DECOMPRESSION AND DEBRIDEMENT (CPT 15778) TONY(ARTHREX)DEBRIDEMENT OF LABRUM , DEBRIDEMENT OF BICEPS performed by Nica Guerrier DO at [address] [facility] Right 2021    5 ANCHORS    TUBAL LIGATION      UPPER GASTROINTESTINAL ENDOSCOPY         Current Outpatient Medications:     doxycycline hyclate (VIBRA-TABS) 100 MG tablet, Take 1 tablet by mouth 2 times daily for 7 days, Disp: 14 tablet, Rfl: 0    methylPREDNISolone (MEDROL DOSEPACK) 4 MG tablet, Take as directed, Disp: 1 kit, Rfl: 0    albuterol sulfate  (90 Base) MCG/ACT inhaler, Inhale 2 puffs into the lungs every 6 hours as needed for Wheezing or Shortness of Breath, Disp: 18 g, Rfl: 3    hydroCHLOROthiazide (HYDRODIURIL) 25 MG tablet, TAKE 1 TABLET BY MOUTH EVERY MORNING, Disp: 90 tablet, Rfl: 0    levothyroxine (SYNTHROID) 50 MCG tablet, TAKE 1 TABLET BY MOUTH DAILY, Disp: 90 tablet, Rfl: 1    ibuprofen (ADVIL;MOTRIN) 800 MG tablet, TAKE 1 TABLET BY MOUTH THREE TIMES DAILY WITH MEALS AS NEEDED FOR PAIN, Disp: , Rfl:     calcium carbonate (OSCAL) 500 MG TABS tablet, Take 1,000 mg by mouth daily, Disp: , Rfl:     ferrous sulfate 325 (65 Fe) MG tablet, Take 325 mg by mouth daily (with breakfast), Disp: , Rfl:     Multiple Vitamin (MULTIVITAMIN PO), Take 1 tablet by mouth 2 times daily. Ultra Rodrick , Disp: , Rfl:   Allergies   Allergen Reactions    Meloxicam      Severe stomach pain    Tramadol Other (See Comments)     Makes blisters in mouth     Social History     Socioeconomic History    Marital status:      Spouse name: Not on file    Number of children: Not on file    Years of education: Not on file    Highest education level: Not on file   Occupational History    Not on file   Tobacco Use    Smoking status: Current Every Day Smoker     Packs/day: 1.00     Years: 7.00     Pack years: 7.00     Types: Cigarettes     Start date: 1/1/2012    Smokeless tobacco: Never Used    Tobacco comment: Previously smoked for 8 years, then quit for 15 years, then started again   Vaping Use    Vaping Use: Never used   Substance and Sexual Activity    Alcohol use: No    Drug use: Never    Sexual activity: Not Currently   Other Topics Concern    Not on file   Social History Narrative    Not on file     Social Determinants of Health     Financial Resource Strain: Low Risk     Difficulty of Paying Living Expenses: Not hard at all   Food Insecurity: No Food Insecurity    Worried About Running Out of Food in the Last Year: Never true    [address] in the Last Year: Never true   Transportation Needs:     Lack of Transportation (Medical):      Lack of Transportation (Non-Medical):    Physical Activity:     Days of Exercise per Week:     Minutes of Exercise per Session:    Stress:     Feeling of Stress :    Social Connections:     Frequency of Communication with Friends and Family:     Frequency of Social Gatherings with Friends and Family:     Attends [employer] Services:     Active Member of Clubs or Organizations:     Attends Club or Organization Meetings:     Marital Status:    Intimate Partner Violence:     Fear of Current or Ex-Partner:     Emotionally Abused:     Physically Abused:     Sexually Abused:      Family History   Problem Relation Age of Onset    High Blood Pressure Mother     Heart Disease Mother     Obesity Mother     High Blood Pressure Father     High Blood Pressure Brother     Cancer Brother     Obesity Brother        REVIEW OF SYSTEMS:     General/Constitution:  (-)weight loss, (-)fever, (-)chills, (-)weakness. Skin: (-) rash,(-) psoriasis,(-) eczema, (-)skin cancer. Musculoskeletal: (-) fractures,  (-) dislocations,(-) collagen vascular disease, (-) fibromyalgia, (-) multiple sclerosis, (-) muscular dystrophy, (-) RSD,(-) joint pain (-)swelling, (-) joint pain,swelling. Neurologic: (-) epilepsy, (-)seizures,(-) brain tumor,(-) TIA, (-)stroke, (-)headaches, (-)Parkinson disease,(-) memory loss, (-) LOC. Cardiovascular: (-) Chest pain, (-) swelling in legs/feet, (-) SOB, (-) cramping in legs/feet with walking. Respiratory: (-) SOB, (-) Coughing, (-) night sweats. GI: (-) nausea, (-) vomiting, (-) diarrhea, (-) blood in stool, (-) gastric ulcer. Psychiatric: (-) Depression, (-) Anxiety, (-) bipolar disease, (-) Alzheimer's Disease  Allergic/Immunologic: (-) allergies latex, (-) allergies metal, (-) skin sensitivity. Hematlogic: (-) anemia, (-) blood transfusion, (-) DVT/PE, (-) Clotting disorders    SUBJECTIVE:    Constitution:  The patient is alert and oriented x 3, appears to be stated age and in no distress. Temp 98 °F (36.7 °C)   Ht 5' 2.5\" (1.588 m)   Wt 209 lb (94.8 kg)   LMP  (LMP Unknown)   BMI 37.62 kg/m²       Skin:  Upon inspection: the skin appears warm, dry and intact. There is  a previous scar over the affected area. There is not any cellulitis, lymphedema or cutaneous lesions noted in the lower extremities. Upon palpation there is no induration noted. Neurologic:  Gait: normal;  Motor exam of the upper extremities show: The reflexes in biceps/triceps/brachioradialis are equal and symmetric. Sensory exam C5-T1 are normal bilaterally. Cardiovascular: The vascular exam is normal and is well perfused to distal extremities. There are 2+ radial pulses bilaterally, and motor and sensation is intact to median, ulnar, and radial, musclocutaneus, and axillary nerve distribution and grossly symmetric bilaterally. There is cap refill noted less than two seconds in all digits. There is not edema of the bilateral upper extremities. There is not varicosities noted in the distal extremities. Lymph:  Upon palpation,  there is no lymphadenopathy noted in bilateral upper extremities. Musculoskeletal:  Gait: normal; examination of the nails and digits reveal no cyanosis or clubbing. Cervical Exam:  On physical exam, Jewell Simms is well-developed, well-nourished, oriented to person, place and time. her gait is normal.  On evaluation of her cervical spine, she has full range of motion of the cervical spine without pain. There is no cervical tenderness to palpation. Shoulder Exam:   On evaluation of her bilaterally upper extremities, her right shoulder has no deformity. There is minimal  tenderness upon palpation of the ac joint. There is not evidence of scapular dyskinesis. There is not muscle atrophy in shoulder girdle. The range of motion for the Right Shoulder is 140/30/t12 and for the Left shoulder is 150/45/t8. Right shoulder Motor strength is 5/5 in the supraspinatus, 5/5 internal rotation and 5/5 in external rotation, and Left shoulder motor strength 5/5 in supraspinatus, 5/5 in internal rotation, 5/5 in external rotation.         Right shoulder:  negative Impingement , negative [place] ,negative  Speeds,negative  Apprehension ,negative Claire Getting Load Shift, negative Sam manuver, positive Cross arm test.     Left shoulder:  negative Impingement , negative [place] ,negative  Speeds,negative  Apprehension ,negative Vasquez Load Shift, negative Sam manuver, negative Cross arm test.     X-ray:  n/a    MRI:  n/a    Radiographic findings reviewed with patient        Impression:       Encounter Diagnoses   Name Primary?  Shoulder impingement, right Yes    Traumatic complete tear of right rotator cuff, initial encounter     Arthritis of right acromioclavicular joint     Labral tear of long head of right biceps tendon, initial encounter        Plan:  Natural history and expected course discussed. Questions answered. Educational material distributed. Reduction in offending activity.   Gentle ROM exercises    Hep  Ibuprofen 800 tid prn  Fu prn

## 2021-10-22 ENCOUNTER — TELEPHONE (OUTPATIENT)
Dept: FAMILY MEDICINE CLINIC | Age: 61
End: 2021-10-22

## 2021-10-22 RX ORDER — CEFDINIR 300 MG/1
300 CAPSULE ORAL 2 TIMES DAILY
Qty: 14 CAPSULE | Refills: 0 | Status: SHIPPED | OUTPATIENT
Start: 2021-10-22 | End: 2021-10-29

## 2021-10-22 NOTE — TELEPHONE ENCOUNTER
Patient left  on clinical care line stating that the doxycycline is hurting her stomach, even when she takes it with food, she just cant take it anymore. She is asking for a substitution. Please advise.

## 2021-10-22 NOTE — TELEPHONE ENCOUNTER
This MA attempted to reach pt. No answer. This MA left message for pt advising a replacement antibiotic has been sent to the pharmacy. Pt advised to discontinue the current treatment and start on the new one ASAP. Pt advised to return call to office if there are any further questions or concerns.     Electronically signed by Edson Alcantar MA on 10/22/21 at 5:00 PM EDT

## 2021-10-23 ASSESSMENT — ENCOUNTER SYMPTOMS
CHOKING: 0
ALLERGIC/IMMUNOLOGIC NEGATIVE: 1
ANAL BLEEDING: 0
FACIAL SWELLING: 0
PHOTOPHOBIA: 0
EYES NEGATIVE: 1
BACK PAIN: 0
WHEEZING: 0
CHEST TIGHTNESS: 0
VOMITING: 0
RECTAL PAIN: 0
SORE THROAT: 0
EYE DISCHARGE: 0
SINUS PRESSURE: 1
COLOR CHANGE: 0
CONSTIPATION: 0
NAUSEA: 0
SHORTNESS OF BREATH: 0
TROUBLE SWALLOWING: 0
DIARRHEA: 0
STRIDOR: 0
ABDOMINAL PAIN: 0
VOICE CHANGE: 0
BLOOD IN STOOL: 0
SINUS PAIN: 1
COUGH: 0
EYE REDNESS: 0
ABDOMINAL DISTENTION: 0
EYE ITCHING: 0
RHINORRHEA: 0
EYE PAIN: 0

## 2021-10-24 NOTE — PROGRESS NOTES
Amber Taylor is a 64 y.o. female. HPI/Chief C/O:  Chief Complaint   Patient presents with    Sinus Problem     Pt c/o ongoing sinus issues    Otalgia     Ear fullness/pain    Dizziness     Pt is dizzy    Headache     Pt also has sinus pressure/headache    Hypertension     Pt states that at the last few doctors visits she has had elevated BP     Other     Pt consents to the telemedicine visit and is currently in [place]     Allergies   Allergen Reactions    Meloxicam      Severe stomach pain    Tramadol Other (See Comments)     Makes blisters in mouth     This 64year old female presents with sinus infection for 2 weeks. Pt c/o nasal congestion ear pain, and vertigo. Pt denies chest pain, denies shortness of breath, denies fever, and denies cough. Pt has meniers disease. TeleMedicine Video Visit    Kvng Ellis, was evaluated through a synchronous (real-time) audio-video encounter. The patient (or guardian if applicable) is aware that this is a billable service. Verbal consent to proceed has been obtained within the past 12 months. The visit was conducted pursuant to the emergency declaration under the [address] [place] [address] authority and the [employer] Act. Patient identification was verified, and a caregiver was present when appropriate. The patient was located in a state where the provider was credentialed to provide care. Patient identification was verified at the start of the visit, including the patient's telephone number and physical location. I discussed with the patient the nature of our telehealth visits, that:     1. Due to the nature of an audio- video modality, the only components of a physical exam that could be done are the elements supported by direct observation. 2. I would evaluate the patient and recommend diagnostics and treatments based on my assessment.   3. If it was felt that the patient should be evaluated in clinic or an emergency room setting, then they would be directed there. 4. Our sessions are not being recorded and that personal health information is protected. 5. Our team would provide follow up care in person if/when the patient needs it. Patient's location: home address in [facility]  Physician  location other address in [facility] other people involved in call  Dr. Kvng Vernon 2    On this date 10/18/2021 I have spent 20 minutes reviewing previous notes, test results and face to face (virtual) with the patient discussing the diagnosis and importance of compliance with the treatment plan as well as documenting on the day of the visit. This visit was completed virtually using D[employer]. me      ROS:  Review of Systems   Constitutional: Positive for fatigue. Negative for activity change, appetite change, chills, diaphoresis, fever and unexpected weight change. HENT: Positive for congestion, ear pain, sinus pressure and sinus pain. Negative for dental problem, drooling, ear discharge, facial swelling, hearing loss, mouth sores, nosebleeds, postnasal drip, rhinorrhea, sneezing, sore throat, tinnitus, trouble swallowing and voice change. Eyes: Negative. Negative for photophobia, pain, discharge, redness, itching and visual disturbance. Respiratory: Negative for cough, choking, chest tightness, shortness of breath, wheezing and stridor. Cardiovascular: Negative. Negative for chest pain, palpitations and leg swelling. Gastrointestinal: Negative for abdominal distention, abdominal pain, anal bleeding, blood in stool, constipation, diarrhea, nausea, rectal pain and vomiting. Endocrine: Negative. Negative for cold intolerance, heat intolerance, polydipsia, polyphagia and polyuria. Genitourinary: Negative.   Negative for decreased urine volume, difficulty urinating, dysuria, flank pain, frequency, genital sores, hematuria, menstrual problem, pelvic pain and urgency. Musculoskeletal: Positive for arthralgias and myalgias. Negative for back pain, gait problem, joint swelling, neck pain and neck stiffness. Skin: Negative. Negative for color change, pallor, rash and wound. Allergic/Immunologic: Negative. Neurological: Positive for dizziness. Negative for tremors, seizures, syncope, facial asymmetry, speech difficulty, weakness, light-headedness, numbness and headaches. Hematological: Negative. Negative for adenopathy. Does not bruise/bleed easily. Psychiatric/Behavioral: Positive for sleep disturbance. Negative for agitation, behavioral problems, confusion, decreased concentration, dysphoric mood, hallucinations, self-injury and suicidal ideas. The patient is nervous/anxious. The patient is not hyperactive.          Past Medical/Surgical Hx;  Reviewed with patient      Diagnosis Date    Anxiety     Chronic back pain     Hypertension     Hypothyroidism     Meniere's disease     Osteoarthritis     Unspecified sleep apnea     no c-pap     Past Surgical History:   Procedure Laterality Date     SECTION      CHOLECYSTECTOMY  2012 - Dr. Matt Jaimes, [facility], Laparoscopic     RIGHT COLECTOMY  2014    [facility] [employer] - laparoscopic right colectomy \" purpose\": intestines flipped\"    TAMMIE-EN-Y GASTRIC BYPASS  2010 - Laparoscopic    SHOULDER ARTHROSCOPY Right 2021    RIGHT SHOULDER ARTHROSCOPY, ROTATOR CUFF REPAIR, SUBACROMIAL DECOMPRESSION AND DEBRIDEMENT (CPT 65373) TONY(ARTHREX)DEBRIDEMENT OF LABRUM , DEBRIDEMENT OF BICEPS performed by Neomi Bosworth, DO at [address] [facility] Right 2021    5 ANCHORS    TUBAL LIGATION      UPPER GASTROINTESTINAL ENDOSCOPY         Past Family Hx:  Reviewed with patient      Problem Relation Age of Onset    High Blood Pressure Mother     Heart Disease Mother     Obesity Mother     High Blood Pressure Father     High Blood Pressure Brother     Cancer Brother     Obesity Brother        Social Hx:  Reviewed with patient  Social History     Tobacco Use    Smoking status: Current Every Day Smoker     Packs/day: 1.00     Years: 7.00     Pack years: 7.00     Types: Cigarettes     Start date: 1/1/2012    Smokeless tobacco: Never Used    Tobacco comment: Previously smoked for 8 years, then quit for 15 years, then started again   Substance Use Topics    Alcohol use: No       OBJECTIVE  LMP  (LMP Unknown)     Problem List:  Emily Rose does not have any pertinent problems on file. PHYS EX:  Pt has nasal congestion. Pt is stable    ASSESSMENT/PLAN  Emily Rose was seen today for sinus problem, otalgia, dizziness, headache, hypertension and other. Diagnoses and all orders for this visit:    Acute bacterial sinusitis  -     Discontinue: doxycycline hyclate (VIBRA-TABS) 100 MG tablet; Take 1 tablet by mouth 2 times daily for 7 days  -     methylPREDNISolone (MEDROL DOSEPACK) 4 MG tablet; Take as directed  -     cefdinir (OMNICEF) 300 MG capsule; Take 1 capsule by mouth 2 times daily for 7 days    Meniere's disease of left ear  -     methylPREDNISolone (MEDROL DOSEPACK) 4 MG tablet; Take as directed  -     cefdinir (OMNICEF) 300 MG capsule; Take 1 capsule by mouth 2 times daily for 7 days    Please take tylenol every 6 hours as needed for temperature or aches   Hydrate with 40 to 50 oz fluids   I have sent in medication for you  Please stay in touch and let me know how you are doing  If you get worse, please call asap  You should self isolate     Pt instructed if any worse go ED ASAP.     Outpatient Encounter Medications as of 10/18/2021   Medication Sig Dispense Refill    cefdinir (OMNICEF) 300 MG capsule Take 1 capsule by mouth 2 times daily for 7 days 14 capsule 0    methylPREDNISolone (MEDROL DOSEPACK) 4 MG tablet Take as directed 1 kit 0    [DISCONTINUED] doxycycline hyclate (VIBRA-TABS) 100 MG tablet Take 1 tablet by mouth 2 times daily for 7 days 14 tablet 0    albuterol sulfate  (90 Base) MCG/ACT inhaler Inhale 2 puffs into the lungs every 6 hours as needed for Wheezing or Shortness of Breath 18 g 3    hydroCHLOROthiazide (HYDRODIURIL) 25 MG tablet TAKE 1 TABLET BY MOUTH EVERY MORNING 90 tablet 0    levothyroxine (SYNTHROID) 50 MCG tablet TAKE 1 TABLET BY MOUTH DAILY 90 tablet 1    ibuprofen (ADVIL;MOTRIN) 800 MG tablet TAKE 1 TABLET BY MOUTH THREE TIMES DAILY WITH MEALS AS NEEDED FOR PAIN      calcium carbonate (OSCAL) 500 MG TABS tablet Take 1,000 mg by mouth daily      ferrous sulfate 325 (65 Fe) MG tablet Take 325 mg by mouth daily (with breakfast)      Multiple Vitamin (MULTIVITAMIN PO) Take 1 tablet by mouth 2 times daily. Ultra Rodrick       [] benzonatate (TESSALON) 100 MG capsule Take 1 capsule by mouth 2 times daily as needed for Cough (Patient not taking: Reported on 10/18/2021) 20 capsule 0     No facility-administered encounter medications on file as of 10/18/2021. No follow-ups on file.         Reviewed recent labs related to Dolores's current problems      Discussed importance of regular Health Maintenance follow up  Health Maintenance   Topic    Hepatitis C screen     Pneumococcal 0-64 years Vaccine (1 of 2 - PPSV23)    HIV screen     DTaP/Tdap/Td vaccine (1 - Tdap)    Cervical cancer screen     Shingles Vaccine (1 of 2)    Flu vaccine (1)    A1C test (Diabetic or Prediabetic)     TSH testing     Potassium monitoring     Creatinine monitoring     Breast cancer screen     Lipid screen     Colon cancer screen colonoscopy     COVID-19 Vaccine     Hepatitis A vaccine     Hepatitis B vaccine     Hib vaccine     Meningococcal (ACWY) vaccine

## 2021-11-13 PROBLEM — R05.9 COUGH: Status: RESOLVED | Noted: 2021-10-14 | Resolved: 2021-11-13

## 2021-11-23 DIAGNOSIS — E03.9 ACQUIRED HYPOTHYROIDISM: ICD-10-CM

## 2021-11-24 RX ORDER — LEVOTHYROXINE SODIUM 0.05 MG/1
50 TABLET ORAL DAILY
Qty: 90 TABLET | Refills: 1 | Status: SHIPPED | OUTPATIENT
Start: 2021-11-24

## 2021-11-30 NOTE — TELEPHONE ENCOUNTER
Out of post op period.  I would recommend taking otc analgesics alternating tylenol and NSAID- can call in prescription strength of NSAID if she would like
Pt called and she got her norco refilled yesterday and said she cant do work and PT without them and 7 is not enough.  I told her we most likely will not be able to send more but I told her I would talk to brit
SPOKE WITH PATIENT AND WILL TRY OTC NSAIDS
no

## 2021-12-10 RX ORDER — IBUPROFEN 800 MG/1
TABLET ORAL
Qty: 90 TABLET | Refills: 0 | Status: SHIPPED | OUTPATIENT
Start: 2021-12-10

## 2022-08-02 NOTE — TELEPHONE ENCOUNTER
Last seen 10/18/2021  Next appt Visit date not found      Called and left message for pt to call the office to schedule.     Electronically signed by Isrrael Smith MA on 8/2/22 at 12:38 PM EDT

## 2022-08-03 NOTE — TELEPHONE ENCOUNTER
2nd attempt, No answer. This MA left message for pt to call the office to schedule an appointment for his medication refills.     Electronically signed by Isrrael Smith MA on 8/3/22 at 8:27 AM EDT

## 2022-08-04 RX ORDER — MECLIZINE HYDROCHLORIDE 25 MG/1
TABLET ORAL
Qty: 50 TABLET | Refills: 0 | OUTPATIENT
Start: 2022-08-04

## 2022-08-04 NOTE — TELEPHONE ENCOUNTER
3rd attempt to call pt. No answer, This MA called and left message for pt to call the office to schedule.      Unable to reach letter sent    Electronically signed by Russ Wynn MA on 8/4/22 at 8:30 AM EDT

## (undated) DEVICE — BLADE SHV L13CM DIA4MM DBL CUT COOLCUT

## (undated) DEVICE — GOWN SURG XL LNG LEN SPUNBOND REINF VELC TIE LEV 4 IMPERV

## (undated) DEVICE — GLOVE SURG SZ 8 L12IN FNGR THK94MIL STD WHT LTX FREE

## (undated) DEVICE — PEN: MARKING STD 100/CS: Brand: MEDICAL ACTION INDUSTRIES

## (undated) DEVICE — CANNULA ARTHSCP L5CM ID8MM DBL DAM 1 PC MOLD LO PROF FLNG

## (undated) DEVICE — 3M™ IOBAN™ 2 ANTIMICROBIAL INCISE DRAPE 6640EZ: Brand: IOBAN™ 2

## (undated) DEVICE — NEEDLE SPNL L3.5IN PNK HUB S STL REG WALL FIT STYL W/ QNCKE

## (undated) DEVICE — TUBING PMP L16FT MAIN DISP FOR AR-6400 AR-6475

## (undated) DEVICE — NEEDLE HYPO 18GA L1.5IN PNK POLYPR HUB S STL THN WALL FILL

## (undated) DEVICE — 1810 FOAM BLOCK NEEDLE COUNTER: Brand: DEVON

## (undated) DEVICE — PROBE ABLAT 90DEG ASPIR MULTIPORT BPLR RF 1 PC ELECTRD ERGO

## (undated) DEVICE — TOWEL OR BLUEE 16X26IN ST 8 PACK ORB08 16X26ORTWL

## (undated) DEVICE — DRESSING GZ XRFRM 4X4(25/BX 6BX/CS)

## (undated) DEVICE — SOLUTION SURG PREP ANTIMICROBIAL 4 OZ SKIN WND EXIDINE

## (undated) DEVICE — SOLUTION IRRIG 1000ML 09% SOD CHL USP PIC PLAS CONTAINER

## (undated) DEVICE — BUR SHV L13CM DIA5.5MM 8 FLUT OVL CLEARCUT COOLCUT

## (undated) DEVICE — DRAPE SURG W88XL116IN SMS BODY SPL ORTH N FEN REINF FLD PCH

## (undated) DEVICE — 5-IN-1 BARBED CONNECTOR POLYPROPYLENE 3/16 - 9/16 IN. (5 - 14.3 MM): Brand: ARGYLE

## (undated) DEVICE — GARMENT COMPR STD FOR 17IN CALF UNIF THER FLOTRN

## (undated) DEVICE — MEDI-VAC NON-CONDUCTIVE SUCTION TUBING: Brand: CARDINAL HEALTH

## (undated) DEVICE — GAUZE,SPONGE,4"X4",16PLY,XRAY,STRL,LF: Brand: MEDLINE

## (undated) DEVICE — GAUZE,SPONGE,4"X4",16PLY,STRL,LF,10/TRAY: Brand: MEDLINE

## (undated) DEVICE — STANDARD HYPODERMIC NEEDLE,POLYPROPYLENE HUB: Brand: MONOJECT

## (undated) DEVICE — SUTURE PROL SZ 3-0 L18IN NONABSORBABLE BLU L19MM PS-2 3/8 8687H

## (undated) DEVICE — DRAPE,SHOULDER,ORTHOMAX,W/POUCH,5/CS: Brand: MEDLINE

## (undated) DEVICE — 3M™ STERI-DRAPE™ U-DRAPE, LONG 1019: Brand: STERI-DRAPE™

## (undated) DEVICE — BASIC PACK: Brand: CONVERTORS

## (undated) DEVICE — NEEDLE SUT PASS FOR ROT CUF LABRAL REP MULTFI SCORPION

## (undated) DEVICE — SOLUTION IV IRRIG POUR BRL 0.9% SODIUM CHL 2F7124

## (undated) DEVICE — CHLORAPREP 26ML ORANGE

## (undated) DEVICE — 3M™ MEDIPORE™ SOFT CLOTH TAPE, 4 INCH X 10 YARDS, 12 ROLLS/CASE, 2964: Brand: 3M™ MEDIPORE™

## (undated) DEVICE — SYRINGE MED 10ML LUERLOCK TIP W/O SFTY DISP

## (undated) DEVICE — SLEEVE TRAC SPANDEX LAT W/ 4IN COBAN SUPERFICIAL RAD NRV PD

## (undated) DEVICE — SPONGE LAP W18XL18IN WHT COT 4 PLY FLD STRUNG RADPQ DISP ST

## (undated) DEVICE — INTENDED FOR TISSUE SEPARATION, AND OTHER PROCEDURES THAT REQUIRE A SHARP SURGICAL BLADE TO PUNCTURE OR CUT.: Brand: BARD-PARKER ® STAINLESS STEEL BLADES